# Patient Record
Sex: FEMALE | Race: WHITE | NOT HISPANIC OR LATINO | URBAN - METROPOLITAN AREA
[De-identification: names, ages, dates, MRNs, and addresses within clinical notes are randomized per-mention and may not be internally consistent; named-entity substitution may affect disease eponyms.]

---

## 2020-11-23 ENCOUNTER — EMERGENCY (EMERGENCY)
Facility: HOSPITAL | Age: 70
LOS: 1 days | Discharge: HOME | End: 2020-11-23

## 2020-11-23 ENCOUNTER — INPATIENT (INPATIENT)
Facility: HOSPITAL | Age: 70
LOS: 6 days | Discharge: HOME | DRG: 913 | End: 2020-11-30
Attending: INTERNAL MEDICINE | Admitting: INTERNAL MEDICINE
Payer: MEDICARE

## 2020-11-23 VITALS — HEIGHT: 62 IN

## 2020-11-23 VITALS
RESPIRATION RATE: 18 BRPM | HEIGHT: 63 IN | DIASTOLIC BLOOD PRESSURE: 84 MMHG | OXYGEN SATURATION: 100 % | SYSTOLIC BLOOD PRESSURE: 154 MMHG | TEMPERATURE: 98 F | WEIGHT: 115.08 LBS | HEART RATE: 111 BPM

## 2020-11-23 DIAGNOSIS — Z90.49 ACQUIRED ABSENCE OF OTHER SPECIFIED PARTS OF DIGESTIVE TRACT: Chronic | ICD-10-CM

## 2020-11-23 DIAGNOSIS — K66.1 HEMOPERITONEUM: ICD-10-CM

## 2020-11-23 LAB
ALBUMIN SERPL ELPH-MCNC: 4.1 G/DL — SIGNIFICANT CHANGE UP (ref 3.3–5)
ALP SERPL-CCNC: 132 U/L — HIGH (ref 40–120)
ALT FLD-CCNC: 27 U/L — SIGNIFICANT CHANGE UP (ref 10–45)
ANION GAP SERPL CALC-SCNC: 18 MMOL/L — HIGH (ref 5–17)
ANION GAP SERPL CALC-SCNC: 19 MMOL/L — HIGH (ref 5–17)
APPEARANCE UR: ABNORMAL
APTT BLD: 29.4 SEC — SIGNIFICANT CHANGE UP (ref 27.5–35.5)
AST SERPL-CCNC: 22 U/L — SIGNIFICANT CHANGE UP (ref 10–40)
BACTERIA # UR AUTO: NEGATIVE — SIGNIFICANT CHANGE UP
BASOPHILS # BLD AUTO: 0 K/UL — SIGNIFICANT CHANGE UP (ref 0–0.2)
BASOPHILS NFR BLD AUTO: 0 % — SIGNIFICANT CHANGE UP (ref 0–2)
BILIRUB SERPL-MCNC: 0.2 MG/DL — SIGNIFICANT CHANGE UP (ref 0.2–1.2)
BILIRUB UR-MCNC: NEGATIVE — SIGNIFICANT CHANGE UP
BLD GP AB SCN SERPL QL: NEGATIVE — SIGNIFICANT CHANGE UP
BUN SERPL-MCNC: 104 MG/DL — HIGH (ref 7–23)
BUN SERPL-MCNC: 109 MG/DL — HIGH (ref 7–23)
CALCIUM SERPL-MCNC: 8.8 MG/DL — SIGNIFICANT CHANGE UP (ref 8.4–10.5)
CALCIUM SERPL-MCNC: 9.2 MG/DL — SIGNIFICANT CHANGE UP (ref 8.4–10.5)
CHLORIDE SERPL-SCNC: 105 MMOL/L — SIGNIFICANT CHANGE UP (ref 96–108)
CHLORIDE SERPL-SCNC: 107 MMOL/L — SIGNIFICANT CHANGE UP (ref 96–108)
CO2 SERPL-SCNC: 15 MMOL/L — LOW (ref 22–31)
CO2 SERPL-SCNC: 16 MMOL/L — LOW (ref 22–31)
COLOR SPEC: SIGNIFICANT CHANGE UP
CREAT SERPL-MCNC: 6.55 MG/DL — HIGH (ref 0.5–1.3)
CREAT SERPL-MCNC: 6.75 MG/DL — HIGH (ref 0.5–1.3)
DIFF PNL FLD: ABNORMAL
EOSINOPHIL # BLD AUTO: 0 K/UL — SIGNIFICANT CHANGE UP (ref 0–0.5)
EOSINOPHIL NFR BLD AUTO: 0 % — SIGNIFICANT CHANGE UP (ref 0–6)
EPI CELLS # UR: 1 /HPF — SIGNIFICANT CHANGE UP
GAS PNL BLDV: SIGNIFICANT CHANGE UP
GLUCOSE SERPL-MCNC: 114 MG/DL — HIGH (ref 70–99)
GLUCOSE SERPL-MCNC: 119 MG/DL — HIGH (ref 70–99)
GLUCOSE UR QL: ABNORMAL
HCT VFR BLD CALC: 18.9 % — CRITICAL LOW (ref 34.5–45)
HCT VFR BLD CALC: 23.2 % — LOW (ref 34.5–45)
HGB BLD-MCNC: 6.4 G/DL — CRITICAL LOW (ref 11.5–15.5)
HGB BLD-MCNC: 7.8 G/DL — LOW (ref 11.5–15.5)
HYALINE CASTS # UR AUTO: 1 /LPF — SIGNIFICANT CHANGE UP (ref 0–2)
INR BLD: 0.97 RATIO — SIGNIFICANT CHANGE UP (ref 0.88–1.16)
KETONES UR-MCNC: NEGATIVE — SIGNIFICANT CHANGE UP
LEUKOCYTE ESTERASE UR-ACNC: ABNORMAL
LIDOCAIN IGE QN: 212 U/L — HIGH (ref 7–60)
LYMPHOCYTES # BLD AUTO: 0.58 K/UL — LOW (ref 1–3.3)
LYMPHOCYTES # BLD AUTO: 4.4 % — LOW (ref 13–44)
MCHC RBC-ENTMCNC: 33.3 PG — SIGNIFICANT CHANGE UP (ref 27–34)
MCHC RBC-ENTMCNC: 33.6 GM/DL — SIGNIFICANT CHANGE UP (ref 32–36)
MCHC RBC-ENTMCNC: 33.9 GM/DL — SIGNIFICANT CHANGE UP (ref 32–36)
MCHC RBC-ENTMCNC: 36.2 PG — HIGH (ref 27–34)
MCV RBC AUTO: 106.8 FL — HIGH (ref 80–100)
MCV RBC AUTO: 99.1 FL — SIGNIFICANT CHANGE UP (ref 80–100)
MONOCYTES # BLD AUTO: 0.57 K/UL — SIGNIFICANT CHANGE UP (ref 0–0.9)
MONOCYTES NFR BLD AUTO: 4.3 % — SIGNIFICANT CHANGE UP (ref 2–14)
NEUTROPHILS # BLD AUTO: 12.08 K/UL — HIGH (ref 1.8–7.4)
NEUTROPHILS NFR BLD AUTO: 91.3 % — HIGH (ref 43–77)
NITRITE UR-MCNC: NEGATIVE — SIGNIFICANT CHANGE UP
NRBC # BLD: 0 /100 WBCS — SIGNIFICANT CHANGE UP (ref 0–0)
PH UR: 6 — SIGNIFICANT CHANGE UP (ref 5–8)
PLATELET # BLD AUTO: 121 K/UL — LOW (ref 150–400)
PLATELET # BLD AUTO: 159 K/UL — SIGNIFICANT CHANGE UP (ref 150–400)
POTASSIUM SERPL-MCNC: 5 MMOL/L — SIGNIFICANT CHANGE UP (ref 3.5–5.3)
POTASSIUM SERPL-MCNC: 6.1 MMOL/L — HIGH (ref 3.5–5.3)
POTASSIUM SERPL-SCNC: 5 MMOL/L — SIGNIFICANT CHANGE UP (ref 3.5–5.3)
POTASSIUM SERPL-SCNC: 6.1 MMOL/L — HIGH (ref 3.5–5.3)
PROT SERPL-MCNC: 7.2 G/DL — SIGNIFICANT CHANGE UP (ref 6–8.3)
PROT UR-MCNC: ABNORMAL
PROTHROM AB SERPL-ACNC: 11.7 SEC — SIGNIFICANT CHANGE UP (ref 10.6–13.6)
RBC # BLD: 1.77 M/UL — LOW (ref 3.8–5.2)
RBC # BLD: 2.34 M/UL — LOW (ref 3.8–5.2)
RBC # FLD: 13 % — SIGNIFICANT CHANGE UP (ref 10.3–14.5)
RBC # FLD: 17.3 % — HIGH (ref 10.3–14.5)
RBC CASTS # UR COMP ASSIST: 9 /HPF — HIGH (ref 0–4)
RH IG SCN BLD-IMP: POSITIVE — SIGNIFICANT CHANGE UP
SARS-COV-2 RNA SPEC QL NAA+PROBE: SIGNIFICANT CHANGE UP
SODIUM SERPL-SCNC: 138 MMOL/L — SIGNIFICANT CHANGE UP (ref 135–145)
SODIUM SERPL-SCNC: 142 MMOL/L — SIGNIFICANT CHANGE UP (ref 135–145)
SP GR SPEC: 1.01 — SIGNIFICANT CHANGE UP (ref 1.01–1.02)
UROBILINOGEN FLD QL: NEGATIVE — SIGNIFICANT CHANGE UP
WBC # BLD: 11.55 K/UL — HIGH (ref 3.8–10.5)
WBC # BLD: 13.23 K/UL — HIGH (ref 3.8–10.5)
WBC # FLD AUTO: 11.55 K/UL — HIGH (ref 3.8–10.5)
WBC # FLD AUTO: 13.23 K/UL — HIGH (ref 3.8–10.5)
WBC UR QL: 27 /HPF — HIGH (ref 0–5)

## 2020-11-23 PROCEDURE — 93010 ELECTROCARDIOGRAM REPORT: CPT

## 2020-11-23 PROCEDURE — 99223 1ST HOSP IP/OBS HIGH 75: CPT

## 2020-11-23 PROCEDURE — 99291 CRITICAL CARE FIRST HOUR: CPT

## 2020-11-23 PROCEDURE — 74176 CT ABD & PELVIS W/O CONTRAST: CPT | Mod: 26

## 2020-11-23 RX ORDER — SODIUM CHLORIDE 9 MG/ML
1000 INJECTION INTRAMUSCULAR; INTRAVENOUS; SUBCUTANEOUS ONCE
Refills: 0 | Status: COMPLETED | OUTPATIENT
Start: 2020-11-23 | End: 2020-11-23

## 2020-11-23 RX ORDER — AMLODIPINE BESYLATE 2.5 MG/1
1 TABLET ORAL
Qty: 0 | Refills: 0 | DISCHARGE

## 2020-11-23 RX ORDER — ONDANSETRON 8 MG/1
4 TABLET, FILM COATED ORAL ONCE
Refills: 0 | Status: COMPLETED | OUTPATIENT
Start: 2020-11-23 | End: 2020-11-23

## 2020-11-23 RX ORDER — DULOXETINE HYDROCHLORIDE 30 MG/1
1 CAPSULE, DELAYED RELEASE ORAL
Qty: 0 | Refills: 0 | DISCHARGE

## 2020-11-23 RX ORDER — SODIUM BICARBONATE 1 MEQ/ML
50 SYRINGE (ML) INTRAVENOUS ONCE
Refills: 0 | Status: COMPLETED | OUTPATIENT
Start: 2020-11-23 | End: 2020-11-23

## 2020-11-23 RX ORDER — ACETAMINOPHEN 500 MG
650 TABLET ORAL EVERY 6 HOURS
Refills: 0 | Status: DISCONTINUED | OUTPATIENT
Start: 2020-11-23 | End: 2020-11-30

## 2020-11-23 RX ORDER — METOPROLOL TARTRATE 50 MG
25 TABLET ORAL DAILY
Refills: 0 | Status: DISCONTINUED | OUTPATIENT
Start: 2020-11-23 | End: 2020-11-30

## 2020-11-23 RX ORDER — SODIUM CHLORIDE 9 MG/ML
1000 INJECTION INTRAMUSCULAR; INTRAVENOUS; SUBCUTANEOUS
Refills: 0 | Status: DISCONTINUED | OUTPATIENT
Start: 2020-11-23 | End: 2020-11-24

## 2020-11-23 RX ORDER — MORPHINE SULFATE 50 MG/1
2 CAPSULE, EXTENDED RELEASE ORAL EVERY 6 HOURS
Refills: 0 | Status: DISCONTINUED | OUTPATIENT
Start: 2020-11-23 | End: 2020-11-30

## 2020-11-23 RX ORDER — AMLODIPINE BESYLATE 2.5 MG/1
5 TABLET ORAL DAILY
Refills: 0 | Status: DISCONTINUED | OUTPATIENT
Start: 2020-11-23 | End: 2020-11-30

## 2020-11-23 RX ORDER — ACETAMINOPHEN 500 MG
975 TABLET ORAL ONCE
Refills: 0 | Status: COMPLETED | OUTPATIENT
Start: 2020-11-23 | End: 2020-11-23

## 2020-11-23 RX ORDER — CEFTRIAXONE 500 MG/1
1000 INJECTION, POWDER, FOR SOLUTION INTRAMUSCULAR; INTRAVENOUS ONCE
Refills: 0 | Status: COMPLETED | OUTPATIENT
Start: 2020-11-23 | End: 2020-11-23

## 2020-11-23 RX ORDER — INSULIN HUMAN 100 [IU]/ML
6 INJECTION, SOLUTION SUBCUTANEOUS ONCE
Refills: 0 | Status: COMPLETED | OUTPATIENT
Start: 2020-11-23 | End: 2020-11-23

## 2020-11-23 RX ORDER — DULOXETINE HYDROCHLORIDE 30 MG/1
60 CAPSULE, DELAYED RELEASE ORAL DAILY
Refills: 0 | Status: DISCONTINUED | OUTPATIENT
Start: 2020-11-23 | End: 2020-11-30

## 2020-11-23 RX ORDER — METOPROLOL TARTRATE 50 MG
1 TABLET ORAL
Qty: 0 | Refills: 0 | DISCHARGE

## 2020-11-23 RX ORDER — OXYCODONE HYDROCHLORIDE 5 MG/1
5 TABLET ORAL EVERY 6 HOURS
Refills: 0 | Status: DISCONTINUED | OUTPATIENT
Start: 2020-11-23 | End: 2020-11-30

## 2020-11-23 RX ORDER — DEXTROSE 50 % IN WATER 50 %
50 SYRINGE (ML) INTRAVENOUS ONCE
Refills: 0 | Status: COMPLETED | OUTPATIENT
Start: 2020-11-23 | End: 2020-11-23

## 2020-11-23 RX ADMIN — SODIUM CHLORIDE 75 MILLILITER(S): 9 INJECTION INTRAMUSCULAR; INTRAVENOUS; SUBCUTANEOUS at 23:43

## 2020-11-23 RX ADMIN — Medication 50 MILLILITER(S): at 15:29

## 2020-11-23 RX ADMIN — Medication 50 MILLIEQUIVALENT(S): at 15:29

## 2020-11-23 RX ADMIN — SODIUM CHLORIDE 1000 MILLILITER(S): 9 INJECTION INTRAMUSCULAR; INTRAVENOUS; SUBCUTANEOUS at 18:27

## 2020-11-23 RX ADMIN — ONDANSETRON 4 MILLIGRAM(S): 8 TABLET, FILM COATED ORAL at 14:33

## 2020-11-23 RX ADMIN — Medication 975 MILLIGRAM(S): at 17:04

## 2020-11-23 RX ADMIN — INSULIN HUMAN 6 UNIT(S): 100 INJECTION, SOLUTION SUBCUTANEOUS at 15:29

## 2020-11-23 RX ADMIN — SODIUM CHLORIDE 1000 MILLILITER(S): 9 INJECTION INTRAMUSCULAR; INTRAVENOUS; SUBCUTANEOUS at 14:33

## 2020-11-23 RX ADMIN — Medication 975 MILLIGRAM(S): at 14:35

## 2020-11-23 RX ADMIN — CEFTRIAXONE 100 MILLIGRAM(S): 500 INJECTION, POWDER, FOR SOLUTION INTRAMUSCULAR; INTRAVENOUS at 18:26

## 2020-11-23 NOTE — H&P ADULT - PROBLEM SELECTOR PLAN 3
imaging with mesenteric hematoma and hemoperitoneum in setting of lovenox use, likely cause of pts abdominal pain and anemia   - surgery following, no plans for surgical intervention  - bowel rest, NPO/IVF,  transfusions and serial H/H  - if patient does not respond appropriately to blood transfusions, will [id number] IR consultation for angiography and possible embolizations  - surgery to c/t follow imaging with mesenteric hematoma and hemoperitoneum in setting of lovenox use, likely cause of pts abdominal pain and anemia   - surgery following, no plans for surgical intervention  - bowel rest, NPO/IVF,  transfusions and serial H/H  - if patient does not respond appropriately to blood transfusions, will [id number] IR consultation for angiography and possible embolizations  - surgery to c/t follow  - pain control with tylenol, oxycodone or morphine as needed

## 2020-11-23 NOTE — H&P ADULT - PROBLEM SELECTOR PLAN 6
stage 4 colon cancer with mets to bladder - unclear if further metastatic disease present. pt awaiting medications to start oral chemo  - f/u with oncology in AM for further collateral and hx

## 2020-11-23 NOTE — H&P ADULT - NSHPREVIEWOFSYSTEMS_GEN_ALL_CORE
REVIEW OF SYSTEMS:    CONSTITUTIONAL: No fevers, chills  EYES/ENT: No visual changes;  no throat pain   NECK: No pain or stiffness  RESPIRATORY: No cough, no shortness of breath  CARDIOVASCULAR: No chest pain or palpitations  GASTROINTESTINAL: +nausea, no vomiting, +abdominal pain as per HPI, no BRBPR  GENITOURINARY: no polyuria, no dysuria, no oliguria   NEUROLOGICAL: no numbness, no headaches, no confusion   MUSCULOSKELETAL: no back pain, no focal weakness   SKIN: No itching, burning, rashes, or lesions   PSYCH: no anxiety, depression  HEME: no gum bleeding, no bruising

## 2020-11-23 NOTE — ED PROVIDER NOTE - OBJECTIVE STATEMENT
69yo F hx htn, neuropathic pain, stage 4 colon ca s/p colectomy, last chemo 1 month ago p/w 1 day of nausea, bilious vomiting, and generalized lower abd pain that feels like cramping and dec PO. Last small BM this AM but notes she shas not passed any gas today. Was sent in by her pcp. No fever, chills, cp, sob, back pain, urinary sx.

## 2020-11-23 NOTE — H&P ADULT - PROBLEM SELECTOR PLAN 2
LEONEL vs CKD - unclear baseline Cr, but elevated to 6.75 with metabolic acidosis and hyperkalemia. HyperK resolved in ED s/p insulin/bicarb. Likely 2/2 severe b/l hydro and bladder mass  - check urine lytes and renal US  - f/u with PMD/urology regarding baseline Cr  - may need decompression with nephrostomy tubes - urology following   - strict I/Os, monitor PVR, monitor CR closely, renally dose medications, avoid nephrotoxins   - renal consult in AM

## 2020-11-23 NOTE — H&P ADULT - PROBLEM SELECTOR PLAN 1
acute anemia with Hb 6.4, unclear baseline - likely in setting of mesenteric hematoma while on full AC. remains HD stable     - s/p 1u PRBC in ED, will f/u post transfusion CBC   - monitor CBC q6-8 hours for now to assess for stability  - active T&S  - surgery following; may need IR consult for embolization if not responding to transfusions

## 2020-11-23 NOTE — H&P ADULT - NSICDXPASTSURGICALHX_GEN_ALL_CORE_FT
PAST SURGICAL HISTORY:   delivery delivered x 2 -  and     H/O colonoscopy     H/O hemicolectomy

## 2020-11-23 NOTE — H&P ADULT - HISTORY OF PRESENT ILLNESS
70F with PMH of HTN, neuropathic pain, stage 4 colon cancer (initially dx 2004 s/p hemicolectomy, with recurrence in past years with mets to the bladder c/b hydronephrosis s/p R ureteral stent last exchanged 9/2020) currently on chemo, DVT (?neck) on lovenox p/w abdominal pain. States she started having abdominal the night prior to admission, located in b/l lower quadrants, intermittent, crampy, 8/10 at its worst, non radiating, mildly improved after urination, had small BM this morning, but minimal flatus since then; has chronic nausea from cancer/chemo, not currently worse than usual, no vomiting, no blood in stool, no dysuria, hematuria, no change in urinary frequency or volume. No dizziness, lightheadedness, no CP, SOB/ELLISON, palpitations. Endorses chronic mild LE edema, currently at baseline. Rest of ROS negative    Pt follows with Dr Goldberg for chemo for stage 4 colon cancer - last chemo 4-5 weeks ago, at which time decision made to change to oral chemo, however has not received new medications yet. Had R ureteral stent placed a couple of years ago and follows with Dr Sawyer q3-5months for exchanges. Pt has required rare intermittent blood transfusions in the past. In not aware if she has kidney disease.

## 2020-11-23 NOTE — CONSULT NOTE ADULT - SUBJECTIVE AND OBJECTIVE BOX
GENERAL SURGERY CONSULT NOTE    70F on therapeutic lovenox with metastatic colon cancer presents with one day of abdominal pain and found to have a mesenteric hematoma on CT scan. As per patient, the pain was sudden onset and did not get better throughout the day. Patient reports passing gas and having bowel movements at home with no vomiting. She reports baseline mild nausea (she had been on chemotherapy until four weeks ago when she was told that they were switching her to an oral chemotherapeutic agent, but insurance has not verified yet). Of note, patient not aware of her home medications but endorses being on lovenox at home for a clot 'diagnosed in her neck'.  On arrival in the ED, patient with mild tachycardia improved with transfusion. Exam with abdominal fullness and palpable left sided mass with associated tenderness. Hemoglobin found to be 6.1 (patient endorses needing transfusions in the past but is not aware of baseline H/H) and creatinine of 6.7. CT scan showing bilateral hydronephrosis with bladder mass and mesenteric hematoma.       PAST MEDICAL & SURGICAL HISTORY:  Lumbar herniated disc    Osteoporosis    H/O colonoscopy     delivery delivered  x  -  and       FAMILY HISTORY:  No pertinent family history in first degree relatives      SOCIAL HISTORY:    MEDICATIONS  (STANDING):    MEDICATIONS  (PRN):    Allergies    No Known Allergies    Intolerances    PHYSICAL EXAM    Vital Signs Last 24 Hrs  T(C): 36.8 (2020 16:03), Max: 36.8 (2020 16:03)  T(F): 98.2 (2020 16:03), Max: 98.2 (2020 16:03)  HR: 99 (2020 16:03) (99 - 111)  BP: 155/87 (2020 16:03) (154/84 - 162/79)  BP(mean): --  RR: 17 (2020 16:03) (17 - 18)  SpO2: 100% (2020 16:03) (100% - 100%)  Daily Height in cm: 160.02 (2020 12:34)    Daily     General: WN/WD NAD  Neurology: A&Ox3, nonfocal, BRENNAN x 4  Head:  Normocephalic, atraumatic  ENT:  Mucosa moist, no ulcerations  Neck:  Supple, no sinuses or palpable masses  Lymphatic:  No palpable cervical, supraclavicular, axillary or inguinal adenopathy  Respiratory: CTA B/L  CV: RRR, S1S2, no murmur  Abdominal: Soft, some fullness and left sided tenderness to palpation, palpable mass on the left abdominal wall  MSK: No edema, + peripheral pulses, FROM all 4 extremity                            6.4    13.23 )-----------( 159      ( 2020 14:23 )             18.9         142  |  107  |  104<H>  ----------------------------<  119<H>  5.0   |  16<L>  |  6.55<H>    Ca    8.8      2020 17:15    TPro  7.2  /  Alb  4.1  /  TBili  0.2  /  DBili  x   /  AST  22  /  ALT  27  /  AlkPhos  132<H>  11-23    PT/INR - ( 2020 14:23 )   PT: 11.7 sec;   INR: 0.97 ratio         PTT - ( 2020 14:23 )  PTT:29.4 sec  Urinalysis Basic - ( 2020 17:16 )    Color: Light Yellow / Appearance: Slightly Turbid / S.012 / pH: x  Gluc: x / Ketone: Negative  / Bili: Negative / Urobili: Negative   Blood: x / Protein: 30 mg/dL / Nitrite: Negative   Leuk Esterase: Moderate / RBC: 9 /hpf / WBC 27 /HPF   Sq Epi: x / Non Sq Epi: 1 /hpf / Bacteria: Negative        IMAGING STUDIES:

## 2020-11-23 NOTE — ED ADULT NURSE REASSESSMENT NOTE - NS ED NURSE REASSESS COMMENT FT1
1847 pt blood transfusion started via pump and tolerating without any incident Pt pending surgery consult for blood in the abd Pt on blood thinner will continue to monitor Mpuleorn

## 2020-11-23 NOTE — CONSULT NOTE ADULT - ASSESSMENT
69 y/o female presenting with anemia found to have bilateral hydronephrosis and left ureteral stent with elevated Cr    -no acute  intervention at this point. pt is followed closely by her outside urologist Chantelle Sawyer MD and gets left ureteral stent changes  q3-5 months (last stent change was 9/2020)  -get baseline creatinine level from outside records  -trend Cr  -check Post Void Residual (will need alberts catheter if pvr >200cc)  -UCx  -will follow along  will discuss with attending, full recs to follow 69 y/o female presenting with anemia found to have bilateral hydronephrosis and left ureteral stent with elevated Cr    -no acute  intervention at this point. pt is followed closely by her outside urologist Chantelle Sawyer MD and gets left ureteral stent changes  q3-5 months (last stent change was 9/2020)  -get baseline creatinine level from outside records  -trend Cr  -check Post Void Residual (will need alberts catheter if pvr >200cc)  -f/u UCx  -will follow along  will discuss with attending, full recs to follow 69 y/o female presenting with anemia found to have bilateral hydronephrosis and left ureteral stent with elevated Cr    -no acute  intervention at this point. pt is followed closely by her outside urologist Chantelle Sawyer MD and gets left ureteral stent changes  q3-5 months (last stent change was 9/2020)  -get baseline creatinine level from outside records  -trend Cr  -Strict I/O, check Post Void Residual (will need alberts catheter if pvr >200cc)  -f/u UCx  -will follow along

## 2020-11-23 NOTE — ED PROVIDER NOTE - CLINICAL SUMMARY MEDICAL DECISION MAKING FREE TEXT BOX
Lower abd pain, n/v, no flatus and dec bowel sounds with some ttp on exam in a pt with hx of colon CA. R/o SBO. Labs, ua, CT pain control Lower abd pain, n/v, no flatus and dec bowel sounds with some ttp on exam in a pt with hx of colon CA. R/o SBO. Labs, ua, CT pain control. Found to have hemoperitoneum with bilateral hydro. Surgery will follow, no intervention at this time. Urology no acute intervention. Anemic, 1uprbc. Admit

## 2020-11-23 NOTE — ED PROVIDER NOTE - NS ED ROS FT
CONSTITUTIONAL: No fevers, no chills, no lightheadedness, no dizziness  EYES: no visual changes, no eye pain  EARS: no ear drainage, no ear pain, no change in hearing  NOSE: no nasal congestion  MOUTH/THROAT: no sore throat  CV: No chest pain, no palpitations  RESP: No SOB, no cough  GI: see hpi  : no dysuria, no hematuria, no flank pain  MSK: no back pain, no extremity pain  SKIN: no rashes  NEURO: no headache, no focal weakness, no decreased sensation/parasthesias

## 2020-11-23 NOTE — ED PROVIDER NOTE - SHIFT CHANGE DETAILS
Attending MD Bueno: 70F with new anemia, new renal failure, here for abdominal pain/distention, pending CT for SBO then TBA, h/x colon ca s/p colectomy, consented for transfusion and blood ordered

## 2020-11-23 NOTE — CONSULT NOTE ADULT - SUBJECTIVE AND OBJECTIVE BOX
Brief onc note:     Pt is a 69 yo female with colon cancer presenting from [facility] for n/v and lower abdominal pain .    - If stable for gen medical floor admit to Dr STEVE Haney   - full consult to follow       Maldonado Chacon MD  Hematology Oncology   [phone]

## 2020-11-23 NOTE — CONSULT NOTE ADULT - ASSESSMENT
70F on therapeutic with metastatic colon cancer presents with one day of abdominal pain and found to have a mesenteric hematoma on CT scan.     -would recommend bowel rest, NPO/IVF,  transfusions and serial H/H  -no operative intervention indicated at this time  -if patient does not respond appropriately to blood transfusions then would recommend IR consultation for angiography and possible embolizations  -will follow with you  -discussed with fellow Dr. Inna Pritchard    page [phone] with surgical questions  Jossy Nicolas, PGY-5

## 2020-11-23 NOTE — ED ADULT NURSE NOTE - NSIMPLEMENTINTERV_GEN_ALL_ED
Implemented All Universal Safety Interventions:  Deer to call system. Call bell, personal items and telephone within reach. Instruct patient to call for assistance. Room bathroom lighting operational. Non-slip footwear when patient is off stretcher. Physically safe environment: no spills, clutter or unnecessary equipment. Stretcher in lowest position, wheels locked, appropriate side rails in place.

## 2020-11-23 NOTE — H&P ADULT - ASSESSMENT
70F with PMH of HTN, neuropathic pain, stage 4 colon cancer (initially dx 2004 s/p hemicolectomy, with recurrence in past years with mets to the bladder c/b hydronephrosis s/p R ureteral stent last exchanged 9/2020) currently on chemo, DVT (?neck) on lovenox p/w abdominal pain -  found with anemia and LEONEL with imaging showing mesenteric hematoma and hemoperitoneum and b/l hydronephrosis - a/w acute blood loss anemia and LEONEL vs CKD.

## 2020-11-23 NOTE — CONSULT NOTE ADULT - SUBJECTIVE AND OBJECTIVE BOX
HPI:  Patient is a 70y Female who presented with  PAST MEDICAL & SURGICAL HISTORY:  Lumbar herniated disc    Osteoporosis    H/O colonoscopy     delivery delivered  x 1977 and       FAMILY HISTORY:  No pertinent family history in first degree relatives      SOCIAL HISTORY:   Tobacco hx:    MEDICATIONS  (STANDING):    MEDICATIONS  (PRN):    Allergies    No Known Allergies    Intolerances        REVIEW OF SYSTEMS: Pertinent positives and negatives as stated in HPI, otherwise negative    Vital signs  T(C): 36.8 (20 @ 16:03), Max: 36.8 (20 @ 16:03)  HR: 99 (20 @ 16:03)  BP: 155/87 (20 @ 16:03)  SpO2: 100% (20 @ 16:03)  Wt(kg): --    Output    UOP    Physical Exam  Gen: NAD  Pulm: No intercostal retractions  Back:  Abd: Soft, NT, ND  : Uncircumcised/Circumcised, no lesions.  No discharge or blood at urethral meatus.  Testes descended bilaterally.  Testes and epididymis nontender bilaterally.  Cremasteric reflex present bilaterally.    LABS:           @ 17:15    WBC --    / Hct --    / SCr 6.55      @ 14:23    WBC 13.23 / Hct 18.9  / SCr 6.75         142  |  107  |  104<H>  ----------------------------<  119<H>  5.0   |  16<L>  |  6.55<H>    Ca    8.8      2020 17:15    TPro  7.2  /  Alb  4.1  /  TBili  0.2  /  DBili  x   /  AST  22  /  ALT  27  /  AlkPhos  132<H>      PT/INR - ( 2020 14:23 )   PT: 11.7 sec;   INR: 0.97 ratio         PTT - ( 2020 14:23 )  PTT:29.4 sec  Urinalysis Basic - ( 2020 17:16 )    Color: Light Yellow / Appearance: Slightly Turbid / S.012 / pH: x  Gluc: x / Ketone: Negative  / Bili: Negative / Urobili: Negative   Blood: x / Protein: 30 mg/dL / Nitrite: Negative   Leuk Esterase: Moderate / RBC: 9 /hpf / WBC 27 /HPF   Sq Epi: x / Non Sq Epi: 1 /hpf / Bacteria: Negative        Urine Cx:   Blood Cx:    RADIOLOGY:     HPI:  Patient is a 70y Female PMHx colon CA sent in to ED by her oncologist for evaluation of abdominal pain.  Urology consulted for CT findings of severe bilateral hydronephrosis.  PT followed by outside urologist Inna Sawyer MD.  Pt with hx of ?obstruction and has left ureteral stent which she gets changed ~q3-5 months (last change was 2020).  Pt states that in the past she was told that her right kidney had poor function and also that creatinine has been high.  Denies any fever, chills, flank pain, difficulty urinating    PAST MEDICAL & SURGICAL HISTORY:  Lumbar herniated disc    Osteoporosis    H/O colonoscopy     delivery delivered  x 2 -  and       FAMILY HISTORY:  No pertinent family history in first degree relatives      SOCIAL HISTORY:   Tobacco hx:    MEDICATIONS  (STANDING):    MEDICATIONS  (PRN):    Allergies    No Known Allergies    Intolerances      REVIEW OF SYSTEMS: Pertinent positives and negatives as stated in HPI, otherwise negative    Vital signs  T(C): 36.8 (20 @ 16:03), Max: 36.8 (20 @ 16:03)  HR: 99 (20 @ 16:03)  BP: 155/87 (20 @ 16:03)  SpO2: 100% (20 @ 16:03)  Wt(kg): --    Output    UOP    Physical Exam  Gen: NAD  Pulm: No intercostal retractions  Back: No CVAT bl  Abd: Soft, NT, ND      LABS:     @ 17:15    WBC --    / Hct --    / SCr 6.55      @ 14:23    WBC 13.23 / Hct 18.9  / SCr 6.75         142  |  107  |  104<H>  ----------------------------<  119<H>  5.0   |  16<L>  |  6.55<H>    Ca    8.8      2020 17:15    TPro  7.2  /  Alb  4.1  /  TBili  0.2  /  DBili  x   /  AST  22  /  ALT  27  /  AlkPhos  132<H>      PT/INR - ( 2020 14:23 )   PT: 11.7 sec;   INR: 0.97 ratio         PTT - ( 2020 14:23 )  PTT:29.4 sec  Urinalysis Basic - ( 2020 17:16 )    Color: Light Yellow / Appearance: Slightly Turbid / S.012 / pH: x  Gluc: x / Ketone: Negative  / Bili: Negative / Urobili: Negative   Blood: x / Protein: 30 mg/dL / Nitrite: Negative   Leuk Esterase: Moderate / RBC: 9 /hpf / WBC 27 /HPF   Sq Epi: x / Non Sq Epi: 1 /hpf / Bacteria: Negative      Urine Cx:  in progress      RADIOLOGY:  EXAM:  CT ABDOMEN AND PELVIS                            PROCEDURE DATE:  2020          INTERPRETATION:  CLINICAL INFORMATION: Abdominal pain. Stage IV colon cancer status post colectomy. On chemotherapy.    COMPARISON: None.    PROCEDURE:  CT of the Abdomen and Pelvis was performed without intravenous contrast.  Intravenous contrast: None.  Oral contrast: None.  Sagittal and coronal reformats were performed.    FINDINGS:  LOWER CHEST: Right pleural effusion.    LIVER: High attenuation of the liver, which may be secondary to medication.  BILE DUCTS: Normal caliber.  GALLBLADDER: Within normal limits.  SPLEEN: Within normal limits.  PANCREAS: Within normal limits.  ADRENALS: Within normal limits.  KIDNEYS/URETERS: Bilateral severe hydronephrosis, right greater than left. On the left side, there is a ureteral stent with the proximal tip within the left renal pelvis and the distal tip correlating within the urinary bladder. Cortical thinning on the right side.    BLADDER: A 3.5 x 2.4 cm high attenuation mass seen in the posterior aspect of the urinary bladder.  REPRODUCTIVE ORGANS: The uterus is unremarkable.    BOWEL: Status post right hemicolectomy with ileocolic anastomosis. High attenuating mesenteric hematoma with hematocrit level seen within the bowel mesentery measuring 5.2 x 3.2 cm (series 2 image 54). There is associated infiltration and edema within the bowel mesentery. No bowel obstruction.  PERITONEUM: Perihepatic and perisplenic complex fluid.  VESSELS: Arterial calcifications.  RETROPERITONEUM/LYMPH NODES: No lymphadenopathy.  ABDOMINAL WALL: High attenuating nodularity seen within the subcutaneous tissues of the right anterior abdomen measuring up to 0.8 cm (series 2 image 75).  BONES: Degenerative changes spine.    IMPRESSION:    Large mesenteric hematoma. Additional hemoperitoneum.    Bladder mass and severe bilateral hydronephrosis.    These findings were discussed with Dr. ENEIDA LOPEZ  at 2020 6:07 PM by Dr. Montoya with read back confirmation.        ZANE MONTOYA MD; Resident Radiology  This document has been electronically signed.  ELIEZER FIELDS MD; Attending Radiologist  This document has been electronically signed. 2020  6:22PM

## 2020-11-23 NOTE — ED CLERICAL - NS ED CLERK NOTE PRE-ARRIVAL INFORMATION; ADDITIONAL PRE-ARRIVAL INFORMATION
CC/Reason For referral: abdominal pain; nausea; vomiting; colon cancer; chemo treatment 10/19/20  Preferred Consultant(if applicable):  Who admits for you (if needed):  Do you have documents you would like to fax over?  Would you still like to speak to an ED attending? YES

## 2020-11-23 NOTE — H&P ADULT - NSHPPHYSICALEXAM_GEN_ALL_CORE
Vital Signs Last 24 Hrs  T(C): 37.2 (23 Nov 2020 22:12), Max: 37.2 (23 Nov 2020 22:12)  T(F): 99 (23 Nov 2020 22:12), Max: 99 (23 Nov 2020 22:12)  HR: 101 (23 Nov 2020 22:12) (99 - 111)  BP: 177/84 (23 Nov 2020 22:12) (154/84 - 177/84)  BP(mean): --  RR: 18 (23 Nov 2020 22:12) (17 - 18)  SpO2: 98% (23 Nov 2020 22:12) (98% - 100%)    PHYSICAL EXAM:  GENERAL: NAD, well-developed  HEAD:  Atraumatic, normocephalic  EYES: EOMI, conjunctiva and sclera clear  NECK: Supple, no JVD  CHEST/LUNG: decreased BS at RLL, no rales, otherwise CTA   HEART: Regular rate and rhythm; no murmurs  ABDOMEN: Soft, tender in lower quadrants R>L, nondistended; bowel sounds present  EXTREMITIES:  2+ Peripheral Pulses, no edema  PSYCH: calm affect, not anxious  NEUROLOGY: non-focal, AAOx3  SKIN: No rashes or lesions  MUSCULOSKELETAL: no back pain, moving all extremities

## 2020-11-23 NOTE — H&P ADULT - NSHPLABSRESULTS_GEN_ALL_CORE
Labs, imaging and EKG personally reviewed and interpreted by me.                         6.4    . )-----------( 159      ( 2020 14:23 )             18.9         142  |  107  |  104<H>  ----------------------------<  119<H>  5.0   |  16<L>  |  6.55<H>    Ca    8.8      2020 17:15    TPro  7.2  /  Alb  4.1  /  TBili  0.2  /  DBili  x   /  AST  22  /  ALT  27  /  AlkPhos  132<H>  1123        PT/INR - ( 2020 14:23 )   PT: 11.7 sec;   INR: 0.97 ratio         PTT - ( 2020 14:23 )  PTT:29.4 sec    Urinalysis Basic - ( 2020 17:16 )    Color: Light Yellow / Appearance: Slightly Turbid / S.012 / pH: x  Gluc: x / Ketone: Negative  / Bili: Negative / Urobili: Negative   Blood: x / Protein: 30 mg/dL / Nitrite: Negative   Leuk Esterase: Moderate / RBC: 9 /hpf / WBC 27 /HPF   Sq Epi: x / Non Sq Epi: 1 /hpf / Bacteria: Negative      < from: CT Abdomen and Pelvis No Cont (20 @ 17:36) >    LIVER: High attenuation of the liver, which may be secondary to medication.  BILE DUCTS: Normal caliber.  GALLBLADDER: Within normal limits.  SPLEEN: Within normal limits.  PANCREAS: Within normal limits.  ADRENALS: Within normal limits.  KIDNEYS/URETERS: Bilateral severe hydronephrosis, right greater than left. On the left side, there is a ureteral stent with the proximal tip within the left renal pelvis and the distal tip correlating within the urinary bladder. Cortical thinning on the right side.    BLADDER: A 3.5 x 2.4 cm high attenuation mass seen in the posterior aspect of the urinary bladder.  REPRODUCTIVE ORGANS: The uterus is unremarkable.    BOWEL: Status post right hemicolectomy with ileocolic anastomosis. High attenuating mesenteric hematoma with hematocrit level seen within the bowel mesentery measuring 5.2 x 3.2 cm (series 2 image 54). There is associated infiltration and edema within the bowel mesentery. No bowel obstruction.  PERITONEUM: Perihepatic and perisplenic complex fluid.  VESSELS: Arterialcalcifications.  RETROPERITONEUM/LYMPH NODES: No lymphadenopathy.  ABDOMINAL WALL: High attenuating nodularity seen within the subcutaneous tissues of the right anterior abdomen measuring up to 0.8 cm (series 2 image 75).  BONES: Degenerative changesspine.    IMPRESSION:    Large mesenteric hematoma. Additional hemoperitoneum.    Bladder mass and severe bilateral hydronephrosis.    < end of copied text >

## 2020-11-23 NOTE — H&P ADULT - ATTENDING COMMENTS
Patient assigned to me by night hospitalist in charge for management and care for patient for this evening only. Care to be resumed by day hospitalist (Dr Haney) in the morning and thereafter.

## 2020-11-23 NOTE — H&P ADULT - NSICDXPASTMEDICALHX_GEN_ALL_CORE_FT
PAST MEDICAL HISTORY:  Colon cancer     DVT, lower extremity     HTN (hypertension)     Hydronephrosis     Lumbar herniated disc     Osteoporosis

## 2020-11-23 NOTE — ED PROVIDER NOTE - PHYSICAL EXAMINATION
Gen: Well appearing, NAD  Head: NCAT  HEENT: PERRL, MMM, normal conjunctiva, anicteric, neck supple  Lung: CTAB, no adventitious sounds  CV: RRR, no murmurs  Abd: soft, lower abd mildly ttp, dec bowel sounds in all quadrants, no rebound or guarding, no CVAT  MSK: No edema, no visible deformities  Neuro: Moving all extremity grossly  Skin: Warm and dry, no evidence of rash

## 2020-11-23 NOTE — ED ADULT NURSE NOTE - OBJECTIVE STATEMENT
69yo F hx htn, neuropathic pain, stage 4 colon ca s/p colectomy, last chemo 1 month ago p/w 1 day of nausea, bilious vomiting, and generalized lower abd pain that feels like cramping and dec PO. Last small BM this AM but notes she has not passed any gas today. Was sent in by her pcp. No fever, chills, cp, sob, back pain, urinary sx.  Pt power port accessed and bloods sent as ordered

## 2020-11-23 NOTE — ED PROVIDER NOTE - PROGRESS NOTE DETAILS
Attending MD Bueno: CT with mesenteric hematoma, hemoperitoneum, bladder mass, severe bilateral hydronephrosis, surgery and uro consulted, will await, patient made aware Attending MD Bueno: Seen by surgery, medical management with bowel rest.  If no response, consider IR for embolization.  No acute surgical management at this time.  Will await uro recs, TBA. Matias: Spoke with urology, no acute intervention indicated, will follow patient with medicine admission.

## 2020-11-23 NOTE — H&P ADULT - PROBLEM SELECTOR PLAN 4
b/l severe hydronephrosis R>L with R ureteral stent noted; per pt, last exchanged 9/2020. Unclear if L hydro is acute or chronic.   - urology following, no plan for intervention at this time   - will need to f/u with Dr Encinas office in AM for further collateral  - strict I/Os  - frequent bladder scans, PVRs   - monitor Cr and electrolytes closely

## 2020-11-23 NOTE — ED PROVIDER NOTE - CARE PLAN
Principal Discharge DX:	Hemoperitoneum  Secondary Diagnosis:	Hydronephrosis  Secondary Diagnosis:	Colon cancer  Secondary Diagnosis:	Abdominal pain

## 2020-11-23 NOTE — ED ADULT NURSE REASSESSMENT NOTE - NS ED NURSE REASSESS COMMENT FT1
1616 pt pot 6.1 meds given by break coverage RN and repeat type and labs to be drawn at 1630 [place]

## 2020-11-23 NOTE — H&P ADULT - PROBLEM SELECTOR PLAN 7
hx of neck vein DVT per pt report, on lovenox (unknown dose)  holding AC for now   f/u with PMD/oncology in AM for further collateral, repeat imaging as needed

## 2020-11-24 DIAGNOSIS — I10 ESSENTIAL (PRIMARY) HYPERTENSION: ICD-10-CM

## 2020-11-24 DIAGNOSIS — D62 ACUTE POSTHEMORRHAGIC ANEMIA: ICD-10-CM

## 2020-11-24 DIAGNOSIS — N13.30 UNSPECIFIED HYDRONEPHROSIS: ICD-10-CM

## 2020-11-24 DIAGNOSIS — S36.892A CONTUSION OF OTHER INTRA-ABDOMINAL ORGANS, INITIAL ENCOUNTER: ICD-10-CM

## 2020-11-24 DIAGNOSIS — C18.9 MALIGNANT NEOPLASM OF COLON, UNSPECIFIED: ICD-10-CM

## 2020-11-24 DIAGNOSIS — N39.0 URINARY TRACT INFECTION, SITE NOT SPECIFIED: ICD-10-CM

## 2020-11-24 DIAGNOSIS — Z29.9 ENCOUNTER FOR PROPHYLACTIC MEASURES, UNSPECIFIED: ICD-10-CM

## 2020-11-24 DIAGNOSIS — N17.9 ACUTE KIDNEY FAILURE, UNSPECIFIED: ICD-10-CM

## 2020-11-24 DIAGNOSIS — G62.9 POLYNEUROPATHY, UNSPECIFIED: ICD-10-CM

## 2020-11-24 DIAGNOSIS — I82.91 CHRONIC EMBOLISM AND THROMBOSIS OF UNSPECIFIED VEIN: ICD-10-CM

## 2020-11-24 DIAGNOSIS — R09.89 OTHER SPECIFIED SYMPTOMS AND SIGNS INVOLVING THE CIRCULATORY AND RESPIRATORY SYSTEMS: ICD-10-CM

## 2020-11-24 LAB
ALBUMIN SERPL ELPH-MCNC: 3.9 G/DL — SIGNIFICANT CHANGE UP (ref 3.3–5)
ALP SERPL-CCNC: 108 U/L — SIGNIFICANT CHANGE UP (ref 40–120)
ALT FLD-CCNC: 22 U/L — SIGNIFICANT CHANGE UP (ref 10–45)
ANION GAP SERPL CALC-SCNC: 18 MMOL/L — HIGH (ref 5–17)
AST SERPL-CCNC: 20 U/L — SIGNIFICANT CHANGE UP (ref 10–40)
BILIRUB SERPL-MCNC: 0.3 MG/DL — SIGNIFICANT CHANGE UP (ref 0.2–1.2)
BUN SERPL-MCNC: 98 MG/DL — HIGH (ref 7–23)
CALCIUM SERPL-MCNC: 8.8 MG/DL — SIGNIFICANT CHANGE UP (ref 8.4–10.5)
CHLORIDE SERPL-SCNC: 108 MMOL/L — SIGNIFICANT CHANGE UP (ref 96–108)
CO2 SERPL-SCNC: 17 MMOL/L — LOW (ref 22–31)
CREAT ?TM UR-MCNC: 48 MG/DL — SIGNIFICANT CHANGE UP
CREAT SERPL-MCNC: 6.36 MG/DL — HIGH (ref 0.5–1.3)
CULTURE RESULTS: SIGNIFICANT CHANGE UP
GLUCOSE SERPL-MCNC: 94 MG/DL — SIGNIFICANT CHANGE UP (ref 70–99)
HCT VFR BLD CALC: 21 % — CRITICAL LOW (ref 34.5–45)
HCT VFR BLD CALC: 21.3 % — LOW (ref 34.5–45)
HCT VFR BLD CALC: 23.1 % — LOW (ref 34.5–45)
HCT VFR BLD CALC: 24.3 % — LOW (ref 34.5–45)
HCV AB S/CO SERPL IA: 0.16 S/CO — SIGNIFICANT CHANGE UP (ref 0–0.99)
HCV AB SERPL-IMP: SIGNIFICANT CHANGE UP
HGB BLD-MCNC: 7 G/DL — CRITICAL LOW (ref 11.5–15.5)
HGB BLD-MCNC: 7.1 G/DL — LOW (ref 11.5–15.5)
HGB BLD-MCNC: 7.7 G/DL — LOW (ref 11.5–15.5)
HGB BLD-MCNC: 8.1 G/DL — LOW (ref 11.5–15.5)
MAGNESIUM SERPL-MCNC: 1.8 MG/DL — SIGNIFICANT CHANGE UP (ref 1.6–2.6)
MCHC RBC-ENTMCNC: 32.8 PG — SIGNIFICANT CHANGE UP (ref 27–34)
MCHC RBC-ENTMCNC: 32.9 GM/DL — SIGNIFICANT CHANGE UP (ref 32–36)
MCHC RBC-ENTMCNC: 33.1 PG — SIGNIFICANT CHANGE UP (ref 27–34)
MCHC RBC-ENTMCNC: 33.3 GM/DL — SIGNIFICANT CHANGE UP (ref 32–36)
MCHC RBC-ENTMCNC: 33.3 GM/DL — SIGNIFICANT CHANGE UP (ref 32–36)
MCHC RBC-ENTMCNC: 33.3 PG — SIGNIFICANT CHANGE UP (ref 27–34)
MCHC RBC-ENTMCNC: 33.5 PG — SIGNIFICANT CHANGE UP (ref 27–34)
MCHC RBC-ENTMCNC: 33.8 GM/DL — SIGNIFICANT CHANGE UP (ref 32–36)
MCV RBC AUTO: 101.4 FL — HIGH (ref 80–100)
MCV RBC AUTO: 98.3 FL — SIGNIFICANT CHANGE UP (ref 80–100)
MCV RBC AUTO: 99.1 FL — SIGNIFICANT CHANGE UP (ref 80–100)
MCV RBC AUTO: 99.2 FL — SIGNIFICANT CHANGE UP (ref 80–100)
NRBC # BLD: 0 /100 WBCS — SIGNIFICANT CHANGE UP (ref 0–0)
PHOSPHATE SERPL-MCNC: 6.1 MG/DL — HIGH (ref 2.5–4.5)
PLATELET # BLD AUTO: 105 K/UL — LOW (ref 150–400)
PLATELET # BLD AUTO: 118 K/UL — LOW (ref 150–400)
PLATELET # BLD AUTO: 128 K/UL — LOW (ref 150–400)
PLATELET # BLD AUTO: 128 K/UL — LOW (ref 150–400)
POTASSIUM SERPL-MCNC: 4.9 MMOL/L — SIGNIFICANT CHANGE UP (ref 3.5–5.3)
POTASSIUM SERPL-SCNC: 4.9 MMOL/L — SIGNIFICANT CHANGE UP (ref 3.5–5.3)
PROT SERPL-MCNC: 6.8 G/DL — SIGNIFICANT CHANGE UP (ref 6–8.3)
RBC # BLD: 2.1 M/UL — LOW (ref 3.8–5.2)
RBC # BLD: 2.12 M/UL — LOW (ref 3.8–5.2)
RBC # BLD: 2.35 M/UL — LOW (ref 3.8–5.2)
RBC # BLD: 2.45 M/UL — LOW (ref 3.8–5.2)
RBC # FLD: 17.8 % — HIGH (ref 10.3–14.5)
RBC # FLD: 17.8 % — HIGH (ref 10.3–14.5)
RBC # FLD: 18 % — HIGH (ref 10.3–14.5)
RBC # FLD: 18.1 % — HIGH (ref 10.3–14.5)
SODIUM SERPL-SCNC: 143 MMOL/L — SIGNIFICANT CHANGE UP (ref 135–145)
SODIUM UR-SCNC: 49 MMOL/L — SIGNIFICANT CHANGE UP
SPECIMEN SOURCE: SIGNIFICANT CHANGE UP
WBC # BLD: 10.61 K/UL — HIGH (ref 3.8–10.5)
WBC # BLD: 10.62 K/UL — HIGH (ref 3.8–10.5)
WBC # BLD: 11.38 K/UL — HIGH (ref 3.8–10.5)
WBC # BLD: 12.04 K/UL — HIGH (ref 3.8–10.5)
WBC # FLD AUTO: 10.61 K/UL — HIGH (ref 3.8–10.5)
WBC # FLD AUTO: 10.62 K/UL — HIGH (ref 3.8–10.5)
WBC # FLD AUTO: 11.38 K/UL — HIGH (ref 3.8–10.5)
WBC # FLD AUTO: 12.04 K/UL — HIGH (ref 3.8–10.5)

## 2020-11-24 PROCEDURE — 71045 X-RAY EXAM CHEST 1 VIEW: CPT | Mod: 26

## 2020-11-24 PROCEDURE — 99233 SBSQ HOSP IP/OBS HIGH 50: CPT

## 2020-11-24 PROCEDURE — 76770 US EXAM ABDO BACK WALL COMP: CPT | Mod: 26

## 2020-11-24 RX ORDER — CEFTRIAXONE 500 MG/1
1000 INJECTION, POWDER, FOR SOLUTION INTRAMUSCULAR; INTRAVENOUS EVERY 24 HOURS
Refills: 0 | Status: COMPLETED | OUTPATIENT
Start: 2020-11-24 | End: 2020-11-26

## 2020-11-24 RX ORDER — CHLORHEXIDINE GLUCONATE 213 G/1000ML
1 SOLUTION TOPICAL DAILY
Refills: 0 | Status: DISCONTINUED | OUTPATIENT
Start: 2020-11-24 | End: 2020-11-30

## 2020-11-24 RX ORDER — SODIUM CHLORIDE 9 MG/ML
1000 INJECTION, SOLUTION INTRAVENOUS
Refills: 0 | Status: DISCONTINUED | OUTPATIENT
Start: 2020-11-24 | End: 2020-11-28

## 2020-11-24 RX ADMIN — CHLORHEXIDINE GLUCONATE 1 APPLICATION(S): 213 SOLUTION TOPICAL at 12:00

## 2020-11-24 RX ADMIN — CEFTRIAXONE 100 MILLIGRAM(S): 500 INJECTION, POWDER, FOR SOLUTION INTRAMUSCULAR; INTRAVENOUS at 17:31

## 2020-11-24 RX ADMIN — DULOXETINE HYDROCHLORIDE 60 MILLIGRAM(S): 30 CAPSULE, DELAYED RELEASE ORAL at 12:00

## 2020-11-24 RX ADMIN — SODIUM CHLORIDE 75 MILLILITER(S): 9 INJECTION, SOLUTION INTRAVENOUS at 17:38

## 2020-11-24 RX ADMIN — AMLODIPINE BESYLATE 5 MILLIGRAM(S): 2.5 TABLET ORAL at 05:44

## 2020-11-24 RX ADMIN — Medication 25 MILLIGRAM(S): at 05:44

## 2020-11-24 NOTE — PROGRESS NOTE ADULT - SUBJECTIVE AND OBJECTIVE BOX
see PA note for detailes    H&P, ER course and reason for admissions reviewed on 11/23/2020    CT scan results reviewed

## 2020-11-24 NOTE — CONSULT NOTE ADULT - ASSESSMENT
70 year-old woman with LEONEL, etiology ATN due to severe anemia/dehydration vs. obstruction.    1. LEONEL- ddx as above.  Continue IVF. F/u urology. If renal fxn does not improve, will have to discuss GOC re: RRT.  2. CKD4- in the setting of known colon cancer s/p chemotherapy.  3. Chronic obstruction. F/u urology.  4. HTN- BP controlled. Continue current antihypertensive medication.

## 2020-11-24 NOTE — PROGRESS NOTE ADULT - SUBJECTIVE AND OBJECTIVE BOX
MARISOL [id number]  70yFemale  T(C): 36.7 (11-24-20 @ 15:52), Max: 37.2 (11-23-20 @ 22:12)  HR: 99 (11-24-20 @ 15:52) (77 - 108)  BP: 130/79 (11-24-20 @ 15:52) (130/79 - 177/84)  RR: 18 (11-24-20 @ 15:52) (17 - 18)  SpO2: 96% (11-24-20 @ 15:52) (96% - 100%)  Wt(kg): --  11-23 @ 07:01  -  11-24 @ 07:00  --------------------------------------------------------  IN: 525 mL / OUT: 650 mL / NET: -125 mL      normal cephalic atraumatic  s1s2   clear to ascultation bilaterally  soft, non tender, non distended no guarding or rebound  no clubbing cyanosis or edema

## 2020-11-24 NOTE — CONSULT NOTE ADULT - SUBJECTIVE AND OBJECTIVE BOX
Reason for consult: colon cancer     HPI:  70F with PMH of HTN, neuropathic pain, stage 4 colon cancer (initially dx 2004 s/p hemicolectomy, with recurrence in past years with mets to the bladder c/b hydronephrosis s/p R ureteral stent last exchanged 2020) last chemo with folfox on 10/19/20  p/w abdominal pain. Patient starting having lower abdominal pain yesterday and was told to come in to the ER for eval. Ct abd shows Status post right hemicolectomy with ileocolic anastomosis. High attenuating mesenteric hematoma with hematocrit level seen within the bowel mesentery measuring 5.2 x 3.2 cm. There is associated infiltration and edema within the bowel mesentery. No bowel obstruction.    Patient states she feels well. has no active abdominal pain. Denies any other bleeding.     PAST MEDICAL & SURGICAL HISTORY:  Hydronephrosis    DVT, lower extremity    Colon cancer    HTN (hypertension)    Lumbar herniated disc    Osteoporosis    H/O hemicolectomy    H/O colonoscopy     delivery delivered  x 2 -  and         FAMILY HISTORY:  No pertinent family history in first degree relatives        Alochol: Denied  Smoking: Nonsmoker  Drug Use: Denied  Marital Status:         Allergies    No Known Allergies    Intolerances        MEDICATIONS  (STANDING):  amLODIPine   Tablet 5 milliGRAM(s) Oral daily  cefTRIAXone   IVPB 1000 milliGRAM(s) IV Intermittent every 24 hours  chlorhexidine 4% Liquid 1 Application(s) Topical daily  DULoxetine 60 milliGRAM(s) Oral daily  metoprolol succinate ER 25 milliGRAM(s) Oral daily  sodium chloride 0.9%. 1000 milliLiter(s) (75 mL/Hr) IV Continuous <Continuous>    MEDICATIONS  (PRN):  acetaminophen   Tablet .. 650 milliGRAM(s) Oral every 6 hours PRN Temp greater or equal to 38C (100.4F), Mild Pain (1 - 3)  morphine  - Injectable 2 milliGRAM(s) IV Push every 6 hours PRN Severe Pain (7 - 10)  oxyCODONE    IR 5 milliGRAM(s) Oral every 6 hours PRN Moderate Pain (4 - 6)      ROS:     General:  No wt loss, fevers, chills, night sweats, fatigue,   Eyes:  Good vision, no reported pain  ENT:  No sore throat, pain, runny nose, dysphagia  CV:  No pain, palpitations, hypo/hypertension  Resp:  No dyspnea, cough, tachypnea, wheezing  GI:  + abdominal pain   :  No pain, bleeding, incontinence, nocturia  Muscle:  No pain, weakness  Neuro:  No weakness, tingling, memory problems  Psych:  No fatigue, insomnia, mood problems, depression  Endocrine:  No polyuria, polydipsia, cold/heat intolerance  Heme:  No petechiae, ecchymosis, easy bruisability        PHYSICAL EXAM:     GENERAL:  Appears stated age, well-groomed  HEENT:  NC/AT,    CHEST:  CTA b/l   HEART:  s1s2+   ABDOMEN:  Soft, + tenderness in lower abdomen   EXTEREMITIES:  no cyanosis,clubbing or edema  SKIN:  No rash/erythema/ecchymoses  NEURO:  Alert, oriented, no asterixis                            7.7    11.38 )-----------( 128      ( 2020 06:40 )             23.1           143  |  108  |  98<H>  ----------------------------<  94  4.9   |  17<L>  |  6.36<H>    Ca    8.8      2020 06:40  Phos  6.1       Mg     1.8         TPro  6.8  /  Alb  3.9  /  TBili  0.3  /  DBili  x   /  AST  20  /  ALT  22  /  AlkPhos  108

## 2020-11-24 NOTE — PROGRESS NOTE ADULT - ASSESSMENT
70F on therapeutic with metastatic colon cancer presents with one day of abdominal pain and found to have a mesenteric hematoma on CT scan.     -would recommend bowel rest, NPO/IVF,  transfusions and serial H/H  -no operative intervention indicated at this time  -if patient does not respond appropriately to blood transfusions then would recommend IR consultation for angiography and possible embolizations  -will follow with you  -further plans to be discussed with attendings on rounds     page [phone] with surgical questions

## 2020-11-24 NOTE — PROVIDER CONTACT NOTE (CRITICAL VALUE NOTIFICATION) - BACKGROUND
Admitted for hemoperitoneum. PMHx of hydronephrosis, DVT, Coln ca, and HTN. 1 unit PRBC transfused 11/23.

## 2020-11-24 NOTE — PROGRESS NOTE ADULT - SUBJECTIVE AND OBJECTIVE BOX
SUBJECTIVE / OVERNIGHT EVENTS: pt denies abd pain , + flatus, denies nausea, v      MEDICATIONS  (STANDING):  amLODIPine   Tablet 5 milliGRAM(s) Oral daily  cefTRIAXone   IVPB 1000 milliGRAM(s) IV Intermittent every 24 hours  chlorhexidine 4% Liquid 1 Application(s) Topical daily  dextrose 5% + sodium chloride 0.9%. 1000 milliLiter(s) (75 mL/Hr) IV Continuous <Continuous>  DULoxetine 60 milliGRAM(s) Oral daily  metoprolol succinate ER 25 milliGRAM(s) Oral daily    MEDICATIONS  (PRN):  acetaminophen   Tablet .. 650 milliGRAM(s) Oral every 6 hours PRN Temp greater or equal to 38C (100.4F), Mild Pain (1 - 3)  morphine  - Injectable 2 milliGRAM(s) IV Push every 6 hours PRN Severe Pain (7 - 10)  oxyCODONE    IR 5 milliGRAM(s) Oral every 6 hours PRN Moderate Pain (4 - 6)    Vital Signs Last 24 Hrs  T(C): 36.9 (2020 21:12), Max: 37.1 (2020 23:55)  T(F): 98.4 (2020 21:12), Max: 98.8 (2020 23:55)  HR: 84 (2020 21:12) (77 - 108)  BP: 163/76 (2020 21:12) (130/79 - 166/97)  BP(mean): 107 (2020 23:55) (107 - 107)  RR: 18 (2020 21:12) (17 - 18)  SpO2: 96% (2020 21:12) (96% - 99%)    CAPILLARY BLOOD GLUCOSE        I&O's Summary    2020 07:01  -  2020 07:00  --------------------------------------------------------  IN: 525 mL / OUT: 650 mL / NET: -125 mL    2020 07:01  -  2020 23:33  --------------------------------------------------------  IN: 0 mL / OUT: 500 mL / NET: -500 mL        Constitutional: No fever, fatigue  Skin: No rash.  Eyes: No recent vision problems or eye pain.  ENT: No congestion, ear pain, or sore throat.  Cardiovascular: No chest pain or palpation.  Respiratory: No cough, shortness of breath, congestion, or wheezing.  Gastrointestinal: No abdominal pain, nausea, vomiting, or diarrhea.  Genitourinary: No dysuria.  Musculoskeletal: No joint swelling.  Neurologic: No headache.    PHYSICAL EXAM:  GENERAL: NAD  EYES: EOMI, PERRLA  NECK: Supple, No JVD  CHEST/LUNG: dec breath sounds at bases   HEART:  S1 , S2 +  ABDOMEN: soft , bs+, mild distension , no tenderness, no guarding   EXTREMITIES:  no edema  NEUROLOGY:alert awake      LABS:                        7.1    10.62 )-----------( 118      ( 2020 21:13 )             21.0     11-    143  |  108  |  98<H>  ----------------------------<  94  4.9   |  17<L>  |  6.36<H>    Ca    8.8      2020 06:40  Phos  6.1     11-24  Mg     1.8     -24    TPro  6.8  /  Alb  3.9  /  TBili  0.3  /  DBili  x   /  AST  20  /  ALT  22  /  AlkPhos  108  11-24    PT/INR - ( 2020 14:23 )   PT: 11.7 sec;   INR: 0.97 ratio         PTT - ( 2020 14:23 )  PTT:29.4 sec      Urinalysis Basic - ( 2020 17:16 )    Color: Light Yellow / Appearance: Slightly Turbid / S.012 / pH: x  Gluc: x / Ketone: Negative  / Bili: Negative / Urobili: Negative   Blood: x / Protein: 30 mg/dL / Nitrite: Negative   Leuk Esterase: Moderate / RBC: 9 /hpf / WBC 27 /HPF   Sq Epi: x / Non Sq Epi: 1 /hpf / Bacteria: Negative        RADIOLOGY & ADDITIONAL TESTS:    Imaging Personally Reviewed:    Consultant(s) Notes Reviewed:      Care Discussed with Consultants/Other Providers:

## 2020-11-24 NOTE — PROGRESS NOTE ADULT - ASSESSMENT
metastatic adeno ca to mesentary, on a/c for clot "in neck"  I think it is unlikely that ir will be able to adress issue.  a/c is contraindicated at this point. hgb stable. follow h/h  pt states her daughter is getting  next weekend.

## 2020-11-24 NOTE — PROGRESS NOTE ADULT - SUBJECTIVE AND OBJECTIVE BOX
SUBJECTIVE:  Reports pain well controlled. NPO Denies N/V. Bowel Function ++  Ambulating independently       OBJECTIVE:  Vital Signs Last 24 Hrs  T(C): 37.1 (23 Nov 2020 23:55), Max: 37.2 (23 Nov 2020 22:12)  T(F): 98.8 (23 Nov 2020 23:55), Max: 99 (23 Nov 2020 22:12)  HR: 100 (23 Nov 2020 23:55) (99 - 111)  BP: 154/84 (23 Nov 2020 23:55) (154/84 - 177/84)  BP(mean): 107 (23 Nov 2020 23:55) (107 - 107)  RR: 17 (23 Nov 2020 23:55) (17 - 18)  SpO2: 99% (23 Nov 2020 23:55) (98% - 100%)    Physical Examination:  GEN: NAD, resting quietly  PULM: symmetric chest rise bilaterally, no increased WOB  ABD: soft, nontender, nondistended  EXTR: no LE erythema, moving all extremities      LABS:                        7.8    11.55 )-----------( 121      ( 23 Nov 2020 23:28 )             23.2       11-23    142  |  107  |  104<H>  ----------------------------<  119<H>  5.0   |  16<L>  |  6.55<H>    Ca    8.8      23 Nov 2020 17:15    TPro  7.2  /  Alb  4.1  /  TBili  0.2  /  DBili  x   /  AST  22  /  ALT  27  /  AlkPhos  132<H>  11-23

## 2020-11-24 NOTE — PROGRESS NOTE ADULT - ASSESSMENT
70 y old with abdominal pain and bilateral hydro.  I reviewed CT scan, labs, surgery note.     - R hydro with severe cortical thinning - minimal function   - L hydro appears chronic with mild cortical thinning - large bore ureteral stent - 4 mm in diam/ 10-12 F with patent lumen on bone window in appropriate position    Pt made 650 ml of urine over night     considering poorly/non-functioning R kidney most of urine from L kidney hence obstruction not likely     CR elevation most likely chronic but I would rec obtaining most recent labs from pt primary onc team     No reason for stent exchange     Follow urine culture    Strict IO   Serial Cr and K, Na    Bladder mass - pt has primary outside urologist - she can follow with her urologist after discharge for management       total time 40 min , > 50% spend coordinating care

## 2020-11-24 NOTE — CONSULT NOTE ADULT - ASSESSMENT
70F with PMH of HTN, neuropathic pain, stage 4 colon cancer (initially dx 2004 s/p hemicolectomy, with recurrence in past years with mets to the bladder c/b hydronephrosis s/p R ureteral stent last exchanged 9/2020) last chemo with folfox on 10/19/20  p/w abdominal pain. Patient starting having lower abdominal pain yesterday and was told to come in to the ER for eval. Ct abd shows Status post right hemicolectomy with ileocolic anastomosis. High attenuating mesenteric hematoma with hematocrit level seen within the bowel mesentery measuring 5.2 x 3.2 cm. There is associated infiltration and edema within the bowel mesentery. No bowel obstruction.    1, Abdominal pain  - pt found to have mesenteric hematoma   - surgery follow up recommended observation .  - hg on presentation was 6.4 s/p PRBC today hg is 7.4   - would check stool guiac   - follow up with surgery reccs     2. Anemia   - macrocytic anemia   - would recommend b12 folate LDH haptoglobin spep   - iron studies would be futile at this time as pt s/p prbc   - monitor cbc closely     3. Colon cancer   - [place] [facility] [place]     Maldonado Chacon MD  Hematology Oncology   [phone] 70F with PMH of HTN, neuropathic pain, stage 4 colon cancer (initially dx 2004 s/p hemicolectomy, with recurrence in past years with mets to the bladder c/b hydronephrosis s/p R ureteral stent last exchanged 9/2020) last chemo with folfox on 10/19/20  p/w abdominal pain. Patient starting having lower abdominal pain yesterday and was told to come in to the ER for eval. Ct abd shows Status post right hemicolectomy with ileocolic anastomosis. High attenuating mesenteric hematoma with hematocrit level seen within the bowel mesentery measuring 5.2 x 3.2 cm. There is associated infiltration and edema within the bowel mesentery. No bowel obstruction.    1, Abdominal pain  - pt found to have mesenteric hematoma   - surgery follow up recommended observation .  - hg on presentation was 6.4 s/p PRBC today hg is 7.4   - would check stool guiac   - follow up with surgery reccs     2. Anemia   - macrocytic anemia   - would recommend b12 folate LDH haptoglobin spep   - iron studies would be futile at this time as pt s/p prbc   - monitor cbc closely     3. Colon cancer   - [place] [facility] [place]     4. Elevated Cr  - pt with b/l hydro with stents   - pt known abnormal cr   - has urologist on the outside.     5. Bladder mass   - unclear if this is new   - [place] [facility] [place]         Maldonado Chacon MD  Hematology Oncology   [phone]

## 2020-11-24 NOTE — CONSULT NOTE ADULT - SUBJECTIVE AND OBJECTIVE BOX
[facility] NEPHROLOGY- CONSULTATION NOTE    Patient is a 70y Female with baseline creatinine 2.0 (last taken on  at outpatient oncology) with metastatic colon cancer who presented to the hospital abdominal pain and was found to have mesenteric hematoma with severe anemia.  Her creatinine was elevated.  She has known bladder mass.  She has chronic L hydronephrosis with large ureteral stent that appears patent.  R severe hydronephrosis with severe cortical thinning, likely non-functional.  Pt has been making urine and has post void residual of ~ 120ml (per nursing).  No alberts catheter.    Pt has trouble giving complete history, and much of it was obtained from chart.    PAST MEDICAL & SURGICAL HISTORY:  Hydronephrosis    DVT, lower extremity    Colon cancer    HTN (hypertension)    Lumbar herniated disc    Osteoporosis    H/O hemicolectomy    H/O colonoscopy     delivery delivered  x 2 -  and   CKD    No Known Allergies    Home Medications Reviewed  Hospital Medications:   MEDICATIONS  (STANDING):  amLODIPine   Tablet 5 milliGRAM(s) Oral daily  cefTRIAXone   IVPB 1000 milliGRAM(s) IV Intermittent every 24 hours  chlorhexidine 4% Liquid 1 Application(s) Topical daily  dextrose 5% + sodium chloride 0.9%. 1000 milliLiter(s) (75 mL/Hr) IV Continuous <Continuous>  DULoxetine 60 milliGRAM(s) Oral daily  metoprolol succinate ER 25 milliGRAM(s) Oral daily    SOCIAL HISTORY:  Denies ETOh,Smoking,   FAMILY HISTORY:  No pertinent family history in first degree relatives      REVIEW OF SYSTEMS:  CONSTITUTIONAL: No weakness, fevers or chills  EYES/ENT: No visual changes;  No vertigo or throat pain   NECK: No pain or stiffness  RESPIRATORY: No cough, wheezing, hemoptysis; No shortness of breath  CARDIOVASCULAR: No chest pain or palpitations.  GASTROINTESTINAL: No abdominal or epigastric pain. No nausea, vomiting, or hematemesis; No diarrhea or constipation. No melena or hematochezia.  GENITOURINARY: No dysuria, frequency, foamy urine, urinary urgency, incontinence or hematuria  NEUROLOGICAL: No numbness or weakness  SKIN: No itching, burning, rashes, or lesions   VASCULAR: No bilateral lower extremity edema.   All other review of systems is negative unless indicated above.    VITALS:  T(F): 98.1 (20 @ 15:52), Max: 99 (20 @ 22:12)  HR: 99 (20 @ 15:52)  BP: 130/79 (20 @ 15:52)  RR: 18 (20 @ 15:52)  SpO2: 96% (20 @ 15:52)  Wt(kg): --     @ 07:01  -   @ 07:00  --------------------------------------------------------  IN: 525 mL / OUT: 650 mL / NET: -125 mL      Height (cm): 160 (:12)  Weight (kg): 54.3 (:12)  BMI (kg/m2): 21.2 (:12)  BSA (m2): 1.55 (:12)    PHYSICAL EXAM:  Constitutional: NAD, thin  HEENT: anicteric sclera, oropharynx clear, MMM  Neck: No JVD  Respiratory: CTAB, no wheezes, rales or rhonchi  Cardiovascular: S1, S2, RRR  Gastrointestinal: BS+, soft, NT/ND  Extremities: No cyanosis or clubbing. No peripheral edema  Neurological: A/O x 3, though a bit confused about medical history, no focal deficits  Psychiatric: Normal mood, normal affect  : No CVA tenderness. No alberts. +suprapubic tenderness (moderate)  Skin: + R chest wall port benign      LABS:      143  |  108  |  98<H>  ----------------------------<  94  4.9   |  17<L>  |  6.36<H>    Ca    8.8      2020 06:40  Phos  6.1       Mg     1.8         TPro  6.8  /  Alb  3.9  /  TBili  0.3  /  DBili      /  AST  20  /  ALT  22  /  AlkPhos  108      Creatinine Trend: 6.36 <--, 6.55 <--, 6.75 <--                        8.1    12.04 )-----------( 128      ( 2020 12:28 )             24.3     Urine Studies:  Urinalysis Basic - ( 2020 17:16 )    Color: Light Yellow / Appearance: Slightly Turbid / S.012 / pH:   Gluc:  / Ketone: Negative  / Bili: Negative / Urobili: Negative   Blood:  / Protein: 30 mg/dL / Nitrite: Negative   Leuk Esterase: Moderate / RBC: 9 /hpf / WBC 27 /HPF   Sq Epi:  / Non Sq Epi: 1 /hpf / Bacteria: Negative      Creatinine, Random Urine: 48 mg/dL ( @ 02:05)  Sodium, Random Urine: 49 mmol/L ( @ 02:05)    RADIOLOGY & ADDITIONAL STUDIES:      < from: US Kidney and Bladder (20 @ 10:01) >    EXAM:  US KIDNEYS AND BLADDER                            PROCEDURE DATE:  2020            INTERPRETATION:  CLINICAL INFORMATION: Acute kidney injury. Creatinine 6.4. Bladder mass with hydronephrosis.    COMPARISON: CT abdomen and pelvis 2020.    TECHNIQUE: Sonography of the kidneys and bladder.    FINDINGS:    Right kidney: 10.5 cm. Severe hydronephrosis with parenchymal atrophy. No renal calculi or solid renal mass identified.    Left kidney: 11.1 cm. Ureteral stent visualized in the renal pelvis. Moderate hydronephrosis. No renal calculi or solid renal mass identified.    Urinary bladder: Ureteral stent visualized in the bladder. Heterogeneous posterior bladder wall mass measures 5.1 x 4.0 x 1.5 cm and demonstrates internal vascularity compatible with known bladder neoplasm.    Miscellaneous: Right pleural effusion.    IMPRESSION:    Severe right hydronephrosis.    Left ureteral stent with moderate left hydronephrosis.    Heterogeneous posterior bladder wall mass compatible with known bladder neoplasm.    Right pleural effusion.                ADI DUNHAM MD; Resident Radiology  This document has been electronically signed.  BRYCE GALAN MD; Attending Radiologist  This document has been electronically signed. 2020 10:10AM    < end of copied text >

## 2020-11-25 LAB
ANION GAP SERPL CALC-SCNC: 15 MMOL/L — SIGNIFICANT CHANGE UP (ref 5–17)
BASOPHILS # BLD AUTO: 0.03 K/UL — SIGNIFICANT CHANGE UP (ref 0–0.2)
BASOPHILS NFR BLD AUTO: 0.3 % — SIGNIFICANT CHANGE UP (ref 0–2)
BUN SERPL-MCNC: 77 MG/DL — HIGH (ref 7–23)
CALCIUM SERPL-MCNC: 9.1 MG/DL — SIGNIFICANT CHANGE UP (ref 8.4–10.5)
CHLORIDE SERPL-SCNC: 113 MMOL/L — HIGH (ref 96–108)
CO2 SERPL-SCNC: 18 MMOL/L — LOW (ref 22–31)
CREAT SERPL-MCNC: 5 MG/DL — HIGH (ref 0.5–1.3)
EOSINOPHIL # BLD AUTO: 0.14 K/UL — SIGNIFICANT CHANGE UP (ref 0–0.5)
EOSINOPHIL NFR BLD AUTO: 1.3 % — SIGNIFICANT CHANGE UP (ref 0–6)
GLUCOSE SERPL-MCNC: 92 MG/DL — SIGNIFICANT CHANGE UP (ref 70–99)
HCT VFR BLD CALC: 29 % — LOW (ref 34.5–45)
HCT VFR BLD CALC: 29.3 % — LOW (ref 34.5–45)
HCT VFR BLD CALC: 31.2 % — LOW (ref 34.5–45)
HGB BLD-MCNC: 10.5 G/DL — LOW (ref 11.5–15.5)
HGB BLD-MCNC: 9.8 G/DL — LOW (ref 11.5–15.5)
HGB BLD-MCNC: 9.8 G/DL — LOW (ref 11.5–15.5)
IMM GRANULOCYTES NFR BLD AUTO: 0.5 % — SIGNIFICANT CHANGE UP (ref 0–1.5)
LYMPHOCYTES # BLD AUTO: 1.49 K/UL — SIGNIFICANT CHANGE UP (ref 1–3.3)
LYMPHOCYTES # BLD AUTO: 13.5 % — SIGNIFICANT CHANGE UP (ref 13–44)
MCHC RBC-ENTMCNC: 32.1 PG — SIGNIFICANT CHANGE UP (ref 27–34)
MCHC RBC-ENTMCNC: 32.2 PG — SIGNIFICANT CHANGE UP (ref 27–34)
MCHC RBC-ENTMCNC: 32.3 PG — SIGNIFICANT CHANGE UP (ref 27–34)
MCHC RBC-ENTMCNC: 33.4 GM/DL — SIGNIFICANT CHANGE UP (ref 32–36)
MCHC RBC-ENTMCNC: 33.7 GM/DL — SIGNIFICANT CHANGE UP (ref 32–36)
MCHC RBC-ENTMCNC: 33.8 GM/DL — SIGNIFICANT CHANGE UP (ref 32–36)
MCV RBC AUTO: 95.4 FL — SIGNIFICANT CHANGE UP (ref 80–100)
MCV RBC AUTO: 96 FL — SIGNIFICANT CHANGE UP (ref 80–100)
MCV RBC AUTO: 96.1 FL — SIGNIFICANT CHANGE UP (ref 80–100)
MONOCYTES # BLD AUTO: 0.9 K/UL — SIGNIFICANT CHANGE UP (ref 0–0.9)
MONOCYTES NFR BLD AUTO: 8.1 % — SIGNIFICANT CHANGE UP (ref 2–14)
NEUTROPHILS # BLD AUTO: 8.45 K/UL — HIGH (ref 1.8–7.4)
NEUTROPHILS NFR BLD AUTO: 76.3 % — SIGNIFICANT CHANGE UP (ref 43–77)
NRBC # BLD: 0 /100 WBCS — SIGNIFICANT CHANGE UP (ref 0–0)
PLATELET # BLD AUTO: 102 K/UL — LOW (ref 150–400)
PLATELET # BLD AUTO: 109 K/UL — LOW (ref 150–400)
PLATELET # BLD AUTO: 111 K/UL — LOW (ref 150–400)
POTASSIUM SERPL-MCNC: 4.1 MMOL/L — SIGNIFICANT CHANGE UP (ref 3.5–5.3)
POTASSIUM SERPL-SCNC: 4.1 MMOL/L — SIGNIFICANT CHANGE UP (ref 3.5–5.3)
RBC # BLD: 3.04 M/UL — LOW (ref 3.8–5.2)
RBC # BLD: 3.05 M/UL — LOW (ref 3.8–5.2)
RBC # BLD: 3.25 M/UL — LOW (ref 3.8–5.2)
RBC # FLD: 18.1 % — HIGH (ref 10.3–14.5)
RBC # FLD: 18.5 % — HIGH (ref 10.3–14.5)
RBC # FLD: 18.5 % — HIGH (ref 10.3–14.5)
SARS-COV-2 IGG SERPL QL IA: NEGATIVE — SIGNIFICANT CHANGE UP
SARS-COV-2 IGM SERPL IA-ACNC: <0.1 INDEX — SIGNIFICANT CHANGE UP
SODIUM SERPL-SCNC: 146 MMOL/L — HIGH (ref 135–145)
WBC # BLD: 11.06 K/UL — HIGH (ref 3.8–10.5)
WBC # BLD: 11.42 K/UL — HIGH (ref 3.8–10.5)
WBC # BLD: 13.05 K/UL — HIGH (ref 3.8–10.5)
WBC # FLD AUTO: 11.06 K/UL — HIGH (ref 3.8–10.5)
WBC # FLD AUTO: 11.42 K/UL — HIGH (ref 3.8–10.5)
WBC # FLD AUTO: 13.05 K/UL — HIGH (ref 3.8–10.5)

## 2020-11-25 PROCEDURE — 93970 EXTREMITY STUDY: CPT | Mod: 26

## 2020-11-25 RX ADMIN — Medication 650 MILLIGRAM(S): at 17:37

## 2020-11-25 RX ADMIN — CHLORHEXIDINE GLUCONATE 1 APPLICATION(S): 213 SOLUTION TOPICAL at 12:00

## 2020-11-25 RX ADMIN — DULOXETINE HYDROCHLORIDE 60 MILLIGRAM(S): 30 CAPSULE, DELAYED RELEASE ORAL at 13:11

## 2020-11-25 RX ADMIN — Medication 650 MILLIGRAM(S): at 17:52

## 2020-11-25 RX ADMIN — CEFTRIAXONE 100 MILLIGRAM(S): 500 INJECTION, POWDER, FOR SOLUTION INTRAMUSCULAR; INTRAVENOUS at 17:37

## 2020-11-25 RX ADMIN — AMLODIPINE BESYLATE 5 MILLIGRAM(S): 2.5 TABLET ORAL at 05:54

## 2020-11-25 RX ADMIN — Medication 25 MILLIGRAM(S): at 05:54

## 2020-11-25 NOTE — PROGRESS NOTE ADULT - SUBJECTIVE AND OBJECTIVE BOX
SUBJECTIVE / OVERNIGHT EVENTS: pt denies abd pain , + flatus, denies nausea, v    MEDICATIONS  (STANDING):  amLODIPine   Tablet 5 milliGRAM(s) Oral daily  cefTRIAXone   IVPB 1000 milliGRAM(s) IV Intermittent every 24 hours  chlorhexidine 4% Liquid 1 Application(s) Topical daily  dextrose 5% + sodium chloride 0.9%. 1000 milliLiter(s) (75 mL/Hr) IV Continuous <Continuous>  DULoxetine 60 milliGRAM(s) Oral daily  metoprolol succinate ER 25 milliGRAM(s) Oral daily    MEDICATIONS  (PRN):  acetaminophen   Tablet .. 650 milliGRAM(s) Oral every 6 hours PRN Temp greater or equal to 38C (100.4F), Mild Pain (1 - 3)  morphine  - Injectable 2 milliGRAM(s) IV Push every 6 hours PRN Severe Pain (7 - 10)  oxyCODONE    IR 5 milliGRAM(s) Oral every 6 hours PRN Moderate Pain (4 - 6)    Vital Signs Last 24 Hrs  T(C): 36.8 (2020 21:11), Max: 36.9 (2020 08:03)  T(F): 98.3 (2020 21:11), Max: 98.4 (2020 08:03)  HR: 87 (2020 21:11) (80 - 88)  BP: 146/82 (2020 21:11) (146/82 - 171/90)  BP(mean): --  RR: 18 (2020 21:11) (18 - 18)  SpO2: 96% (:11) (96% - 99%)    Constitutional: No fever, fatigue  Skin: No rash.  Eyes: No recent vision problems or eye pain.  ENT: No congestion, ear pain, or sore throat.  Cardiovascular: No chest pain or palpation.  Respiratory: No cough, shortness of breath, congestion, or wheezing.  Gastrointestinal: No abdominal pain, nausea, vomiting, or diarrhea.  Genitourinary: No dysuria.  Musculoskeletal: No joint swelling.  Neurologic: No headache.    PHYSICAL EXAM:  GENERAL: NAD  EYES: EOMI, PERRLA  NECK: Supple, No JVD  CHEST/LUNG: dec breath sounds at bases   HEART:  S1 , S2 +  ABDOMEN: soft , bs+, mild distension , no tenderness, no guarding   EXTREMITIES:  no edema  NEUROLOGY:alert awake      LABS:  11-25    146<H>  |  113<H>  |  77<H>  ----------------------------<  92  4.1   |  18<L>  |  5.00<H>    Ca    9.1      2020 06:06  Phos  6.1       Mg     1.8         TPro  6.8  /  Alb  3.9  /  TBili  0.3  /  DBili      /  AST  20  /  ALT  22  /  AlkPhos  108      Creatinine Trend: 5.00 <--, 6.36 <--, 6.55 <--, 6.75 <--                        10.5   13.05 )-----------( 111      ( 2020 17:51 )             31.2     Urine Studies:  Urinalysis Basic - ( 2020 17:16 )    Color: Light Yellow / Appearance: Slightly Turbid / S.012 / pH:   Gluc:  / Ketone: Negative  / Bili: Negative / Urobili: Negative   Blood:  / Protein: 30 mg/dL / Nitrite: Negative   Leuk Esterase: Moderate / RBC: 9 /hpf / WBC 27 /HPF   Sq Epi:  / Non Sq Epi: 1 /hpf / Bacteria: Negative      Creatinine, Random Urine: 48 mg/dL ( @ 02:05)  Sodium, Random Urine: 49 mmol/L ( @ 02:05)          LIVER FUNCTIONS - ( 2020 06:40 )  Alb: 3.9 g/dL / Pro: 6.8 g/dL / ALK PHOS: 108 U/L / ALT: 22 U/L / AST: 20 U/L / GGT: x

## 2020-11-25 NOTE — PROGRESS NOTE ADULT - ASSESSMENT
70 year-old woman with LEONEL, etiology ATN due to severe anemia/dehydration vs. obstruction.    1. LEONEL- ddx as above.  Continue IVF. F/u urology. Renal fxn staring to improve. If renal fxn does not improve further, will have to discuss GOC re: RRT.  2. CKD4- in the setting of known colon cancer s/p chemotherapy.  3. Chronic obstruction. F/u urology.  4. HTN- BP a bit elevated.  Monitor for now. Continue current antihypertensive medication.

## 2020-11-25 NOTE — PROGRESS NOTE ADULT - SUBJECTIVE AND OBJECTIVE BOX
Interval events: recieved 1 upRBC @1730    SUBJECTIVE:  Reports pain well controlled. NPO. Denies N/V. Bowel Function +/+  Ambulating independently       OBJECTIVE:  Vital Signs Last 24 Hrs  T(C): 36.7 (25 Nov 2020 00:45), Max: 37 (24 Nov 2020 08:04)  T(F): 98 (25 Nov 2020 00:45), Max: 98.6 (24 Nov 2020 08:04)  HR: 84 (25 Nov 2020 00:45) (77 - 108)  BP: 154/83 (25 Nov 2020 00:45) (130/79 - 166/97)  BP(mean): --  RR: 18 (25 Nov 2020 00:45) (18 - 18)  SpO2: 97% (25 Nov 2020 00:45) (96% - 98%)    Physical Examination:  GEN: NAD, resting quietly  PULM: symmetric chest rise bilaterally, no increased WOB  ABD: soft, nontender, nondistended, incision CDI  EXTR: no LE erythema, moving all extremities      LABS:                        7.1    10.62 )-----------( 118      ( 24 Nov 2020 21:13 )             21.0       11-24    143  |  108  |  98<H>  ----------------------------<  94  4.9   |  17<L>  |  6.36<H>    Ca    8.8      24 Nov 2020 06:40  Phos  6.1     11-24  Mg     1.8     11-24    TPro  6.8  /  Alb  3.9  /  TBili  0.3  /  DBili  x   /  AST  20  /  ALT  22  /  AlkPhos  108  11-24           Interval events: recieved 1 upRBC @1730    SUBJECTIVE:  Reports pain well controlled. Was anemic yesterday received 1 u pRBC, NPO. Denies N/V. Bowel Function +/+, reports being hungry  Ambulating independently       OBJECTIVE:  Vital Signs Last 24 Hrs  T(C): 36.7 (25 Nov 2020 00:45), Max: 37 (24 Nov 2020 08:04)  T(F): 98 (25 Nov 2020 00:45), Max: 98.6 (24 Nov 2020 08:04)  HR: 84 (25 Nov 2020 00:45) (77 - 108)  BP: 154/83 (25 Nov 2020 00:45) (130/79 - 166/97)  BP(mean): --  RR: 18 (25 Nov 2020 00:45) (18 - 18)  SpO2: 97% (25 Nov 2020 00:45) (96% - 98%)    Physical Examination:  GEN: NAD, resting quietly  PULM: symmetric chest rise bilaterally, no increased WOB  ABD: soft, nontender, nondistended, incision CDI  EXTR: no LE erythema, moving all extremities      LABS:                        7.1    10.62 )-----------( 118      ( 24 Nov 2020 21:13 )             21.0       11-24    143  |  108  |  98<H>  ----------------------------<  94  4.9   |  17<L>  |  6.36<H>    Ca    8.8      24 Nov 2020 06:40  Phos  6.1     11-24  Mg     1.8     11-24    TPro  6.8  /  Alb  3.9  /  TBili  0.3  /  DBili  x   /  AST  20  /  ALT  22  /  AlkPhos  108  11-24

## 2020-11-25 NOTE — PROGRESS NOTE ADULT - SUBJECTIVE AND OBJECTIVE BOX
Patient seen and examined at bedside. feels well. no complaints. had prbc over night.     MEDICATIONS  (STANDING):  amLODIPine   Tablet 5 milliGRAM(s) Oral daily  cefTRIAXone   IVPB 1000 milliGRAM(s) IV Intermittent every 24 hours  chlorhexidine 4% Liquid 1 Application(s) Topical daily  dextrose 5% + sodium chloride 0.9%. 1000 milliLiter(s) (75 mL/Hr) IV Continuous <Continuous>  DULoxetine 60 milliGRAM(s) Oral daily  metoprolol succinate ER 25 milliGRAM(s) Oral daily    MEDICATIONS  (PRN):  acetaminophen   Tablet .. 650 milliGRAM(s) Oral every 6 hours PRN Temp greater or equal to 38C (100.4F), Mild Pain (1 - 3)  morphine  - Injectable 2 milliGRAM(s) IV Push every 6 hours PRN Severe Pain (7 - 10)  oxyCODONE    IR 5 milliGRAM(s) Oral every 6 hours PRN Moderate Pain (4 - 6)        Vital Signs Last 24 Hrs  T(C): 36.9 (25 Nov 2020 08:03), Max: 36.9 (24 Nov 2020 21:12)  T(F): 98.4 (25 Nov 2020 08:03), Max: 98.4 (24 Nov 2020 21:12)  HR: 81 (25 Nov 2020 08:03) (80 - 99)  BP: 154/87 (25 Nov 2020 08:03) (130/79 - 171/90)  BP(mean): --  RR: 18 (25 Nov 2020 08:03) (18 - 18)  SpO2: 98% (25 Nov 2020 08:03) (96% - 98%)      PHYSICAL EXAM:     GENERAL:  Appears stated age, well-groomed  HEENT:  NC/AT,  CHEST:  CTA b/l  HEART:  S1 s2+   ABDOMEN:  Soft, mild tendeness of abdomen   EXTEREMITIES:  no cyanosis,clubbing or edema  SKIN:  No rash/erythema/ecchymoses  NEURO:  Alert, oriented, no asterixis                            9.8    11.06 )-----------( 109      ( 25 Nov 2020 06:06 )             29.3       11-25    146<H>  |  113<H>  |  77<H>  ----------------------------<  92  4.1   |  18<L>  |  5.00<H>    Ca    9.1      25 Nov 2020 06:06  Phos  6.1     11-24  Mg     1.8     11-24    TPro  6.8  /  Alb  3.9  /  TBili  0.3  /  DBili  x   /  AST  20  /  ALT  22  /  AlkPhos  108  11-24

## 2020-11-25 NOTE — PROGRESS NOTE ADULT - SUBJECTIVE AND OBJECTIVE BOX
[facility] NEPHROLOGY- CONSULTATION NOTE    Patient is a 70y Female with baseline creatinine 2.0 (last taken on  at outpatient oncology) with metastatic colon cancer who presented to the hospital abdominal pain and was found to have mesenteric hematoma with severe anemia.  Her creatinine was elevated.  She has known bladder mass.  She has chronic L hydronephrosis with large ureteral stent that appears patent.  R severe hydronephrosis with severe cortical thinning, likely non-functional.  Pt has been making urine and has post void residual of ~ 120ml (per nursing).  No alberts catheter.    Pt feels okay. She has mild abd pain.    REVIEW OF SYSTEMS:  CONSTITUTIONAL: + weakness, no fevers or chills  EYES/ENT: No visual changes;  No vertigo or throat pain   NECK: No pain or stiffness  RESPIRATORY: No cough, wheezing, hemoptysis; No shortness of breath  CARDIOVASCULAR: No chest pain or palpitations.  GASTROINTESTINAL: + mild  abdominal pain. No nausea, vomiting, or hematemesis; No diarrhea or constipation. No melena or hematochezia.  GENITOURINARY: + hematuria, mild dysuria  NEUROLOGICAL: No numbness or weakness  VASCULAR: No bilateral lower extremity edema.       VITALS:  T(F): 98.4 (20 @ 08:03), Max: 98.4 (20 @ 21:12)  HR: 81 (20 @ 08:03)  BP: 154/87 (20 @ 08:03)  RR: 18 (20 @ 08:03)  SpO2: 98% (20 @ 08:03)  Wt(kg): --     @ 07:01  -   @ 07:00  --------------------------------------------------------  IN: 300 mL / OUT: 1050 mL / NET: -750 mL      PHYSICAL EXAM:  Constitutional: NAD, thin  HEENT: anicteric sclera, oropharynx clear, MMM  Neck: No JVD  Respiratory: CTAB, no wheezes, rales or rhonchi  Cardiovascular: S1, S2, RRR  Gastrointestinal: BS+, soft, NT/ND  Extremities: No cyanosis or clubbing. No peripheral edema  Neurological: A/O x 3, though a bit confused about medical history, no focal deficits  Psychiatric: Normal mood, normal affect  : No CVA tenderness. No alberts. +suprapubic tenderness (moderate)  Skin: + R chest wall port benign        LABS:      146<H>  |  113<H>  |  77<H>  ----------------------------<  92  4.1   |  18<L>  |  5.00<H>    Ca    9.1      2020 06:06  Phos  6.1       Mg     1.8         TPro  6.8  /  Alb  3.9  /  TBili  0.3  /  DBili      /  AST  20  /  ALT  22  /  AlkPhos  108      Creatinine Trend: 5.00 <--, 6.36 <--, 6.55 <--, 6.75 <--                        9.8    11. )-----------( 109      ( 2020 06:06 )             29.3     Urine Studies:  Urinalysis Basic - ( 2020 17:16 )    Color: Light Yellow / Appearance: Slightly Turbid / S.012 / pH:   Gluc:  / Ketone: Negative  / Bili: Negative / Urobili: Negative   Blood:  / Protein: 30 mg/dL / Nitrite: Negative   Leuk Esterase: Moderate / RBC: 9 /hpf / WBC 27 /HPF   Sq Epi:  / Non Sq Epi: 1 /hpf / Bacteria: Negative      Creatinine, Random Urine: 48 mg/dL ( @ 02:05)  Sodium, Random Urine: 49 mmol/L ( @ 02:05)              ADI DUNHAM MD; Resident Radiology  This document has been electronically signed.  BRYCE GALAN MD; Attending Radiologist  This document has been electronically signed. 2020 10:10AM    < end of copied text >

## 2020-11-25 NOTE — PROGRESS NOTE ADULT - SUBJECTIVE AND OBJECTIVE BOX
MARISOL [id number]  70yFemale  T(C): 36.9 (11-25-20 @ 08:03), Max: 36.9 (11-24-20 @ 21:12)  HR: 81 (11-25-20 @ 08:03) (80 - 99)  BP: 154/87 (11-25-20 @ 08:03) (130/79 - 171/90)  RR: 18 (11-25-20 @ 08:03) (18 - 18)  SpO2: 98% (11-25-20 @ 08:03) (96% - 98%)  Wt(kg): --  11-24 @ 07:01  -  11-25 @ 07:00  --------------------------------------------------------  IN: 300 mL / OUT: 1050 mL / NET: -750 mL      normal cephalic atraumatic  s1s2   clear to ascultation bilaterally  soft, non tender, non distended no guarding or rebound  no clubbing cyanosis or edema

## 2020-11-25 NOTE — PROGRESS NOTE ADULT - ASSESSMENT
70F on therapeutic with metastatic colon cancer presents with one day of abdominal pain and found to have a mesenteric hematoma on CT scan.     -would recommend bowel rest, NPO/IVF,  transfusions and serial H/H  -no operative intervention indicated at this time  -if patient does not respond appropriately to blood transfusions then would recommend IR consultation for angiography and possible embolizations  -will follow with you  -further plans to be discussed with attendings on rounds     page [phone] with surgical questions   70F on therapeutic with metastatic colon cancer presents with one day of abdominal pain and found to have a mesenteric hematoma on CT scan. Received 1 u pRBC for anemia with over response (7.1 -> 9.8)     Plan  -advance diet to CLD  -no operative intervention indicated at this time  -if patient does not respond appropriately to blood transfusions then would recommend IR consultation for angiography and possible embolizations  -will follow with you    page [phone] with surgical questions

## 2020-11-25 NOTE — CHART NOTE - NSCHARTNOTEFT_GEN_A_CORE
Called  By  Rn    that  patient    H/H  dropped    from  8.1/23  to  7/21.3.  Pt  received   1  unit  of  PRBC  in  the  ED .   Pt  was admitted     for  acute  blood  loss anemia  most  likely  in  the  setting  of  Mesenteric  hematoma.  Patient is a 70y old  Female who presents with a chief complaint of abdominal pain (25 Nov 2020 01:51)  Pt denies  any  tachycardia,  dark  stool  or  acute  bleeding  at this  time.  Pt  is hemodynamically  stable.      Vital Signs Last 24 Hrs  T(C): 36.7 (25 Nov 2020 04:05), Max: 37 (24 Nov 2020 08:04)  T(F): 98.1 (25 Nov 2020 04:05), Max: 98.6 (24 Nov 2020 08:04)  HR: 80 (25 Nov 2020 05:52) (77 - 99)  BP: 171/90 (25 Nov 2020 05:52) (130/79 - 171/90)  BP(mean): --  RR: 18 (25 Nov 2020 04:05) (18 - 18)  SpO2: 98% (25 Nov 2020 04:05) (96% - 98%)      Labs:                          7.1    10.62 )-----------( 118      ( 24 Nov 2020 21:13 )             21.0     11-24    143  |  108  |  98<H>  ----------------------------<  94  4.9   |  17<L>  |  6.36<H>    Ca    8.8      24 Nov 2020 06:40  Phos  6.1     11-24  Mg     1.8     11-24    TPro  6.8  /  Alb  3.9  /  TBili  0.3  /  DBili  x   /  AST  20  /  ALT  22  /  AlkPhos  108  11-24        Radiology:    Physical Exam:  General: WN/WD NAD  Neurology: A&Ox3, nonfocal, BRENNAN x 4  Head:  Normocephalic, atraumatic  Respiratory: CTA B/L  CV: RRR, S1S2, no murmur  Abdominal: Soft, NT, ND no palpable mass  MSK: No edema, + peripheral pulses, FROM all 4 extremity    Assessment & Plan:  HPI:  70F with PMH of HTN, neuropathic pain, stage 4 colon cancer (initially dx 2004 s/p hemicolectomy, with recurrence in past years with mets to the bladder c/b hydronephrosis s/p R ureteral stent last exchanged 9/2020) currently on chemo, DVT (?neck) on lovenox p/w abdominal pain. States she started having abdominal the night prior to admission, located in b/l lower quadrants, intermittent, crampy, 8/10 at its worst, non radiating, mildly improved after urination, had small BM this morning, but minimal flatus since then; has chronic nausea from cancer/chemo, not currently worse than usual, no vomiting, no blood in stool, no dysuria, hematuria, no change in urinary frequency or volume. No dizziness, lightheadedness, no CP, SOB/ELLISON, palpitations. Endorses chronic mild LE edema, currently at baseline. Rest of ROS negative    Pt follows with Dr Goldberg for chemo for stage 4 colon cancer - last chemo 4-5 weeks ago, at which time decision made to change to oral chemo, however has not received new medications yet. Had R ureteral stent placed a couple of years ago and follows with Dr Sawyer q3-5months for exchanges. Pt has required rare intermittent blood transfusions in the past. In not aware if she has kidney disease.      (23 Nov 2020 23:29)    A/P Anemia   >   I  units  PRBC    prescribed    >  F/u  with  CBC  in  the  am    >  Continue  to  monitor  pt  closely.          Follow up with Attending in AM. Called  By  Rn    that  patient    H/H  dropped    from  8.1/23  to  7/21.3.  Pt  received   1  unit  of  PRBC  in  the  ED .   Pt  was admitted     for  acute  blood  loss anemia  most  likely  in  the  setting  of  Mesenteric  hematoma.  Patient is a 70y old  Female who presents with a chief complaint of abdominal pain (25 Nov 2020 01:51)  Pt denies  any  tachycardia,  dark  stool  or  acute  bleeding  at this  time.  Pt  is hemodynamically  stable.      Vital Signs Last 24 Hrs  T(C): 36.7 (25 Nov 2020 04:05), Max: 37 (24 Nov 2020 08:04)  T(F): 98.1 (25 Nov 2020 04:05), Max: 98.6 (24 Nov 2020 08:04)  HR: 80 (25 Nov 2020 05:52) (77 - 99)  BP: 171/90 (25 Nov 2020 05:52) (130/79 - 171/90)  BP(mean): --  RR: 18 (25 Nov 2020 04:05) (18 - 18)  SpO2: 98% (25 Nov 2020 04:05) (96% - 98%)      Labs:                          7.1    10.62 )-----------( 118      ( 24 Nov 2020 21:13 )             21.0     11-24    143  |  108  |  98<H>  ----------------------------<  94  4.9   |  17<L>  |  6.36<H>    Ca    8.8      24 Nov 2020 06:40  Phos  6.1     11-24  Mg     1.8     11-24    TPro  6.8  /  Alb  3.9  /  TBili  0.3  /  DBili  x   /  AST  20  /  ALT  22  /  AlkPhos  108  11-24        Radiology:    Physical Exam:  General: WN/WD NAD  Neurology: A&Ox3, nonfocal, BRENNAN x 4  Head:  Normocephalic, atraumatic  Respiratory: CTA B/L  CV: RRR, S1S2, no murmur  Abdominal: Soft, NT, ND no palpable mass  MSK: No edema, + peripheral pulses, FROM all 4 extremity    Assessment & Plan:  HPI:  70F with PMH of HTN, neuropathic pain, stage 4 colon cancer (initially dx 2004 s/p hemicolectomy, with recurrence in past years with mets to the bladder c/b hydronephrosis s/p R ureteral stent last exchanged 9/2020) currently on chemo, DVT (?neck) on lovenox p/w abdominal pain. States she started having abdominal the night prior to admission, located in b/l lower quadrants, intermittent, crampy, 8/10 at its worst, non radiating, mildly improved after urination, had small BM this morning, but minimal flatus since then; has chronic nausea from cancer/chemo, not currently worse than usual, no vomiting, no blood in stool, no dysuria, hematuria, no change in urinary frequency or volume. No dizziness, lightheadedness, no CP, SOB/ELLISON, palpitations. Endorses chronic mild LE edema, currently at baseline. Rest of ROS negative    Pt follows with Dr Goldberg for chemo for stage 4 colon cancer - last chemo 4-5 weeks ago, at which time decision made to change to oral chemo, however has not received new medications yet. Had R ureteral stent placed a couple of years ago and follows with Dr Sawyer q3-5months for exchanges. Pt has required rare intermittent blood transfusions in the past. In not aware if she has kidney disease.      (23 Nov 2020 23:29)    A/P Anemia   >   I  units  PRBC    prescribed    >  F/u  with  CBC  in  the  am    >  Continue  to  monitor  pt  closely.    F/U  Stool  Occult  (  )    Surgery  following        Follow up with Attending in AM.

## 2020-11-25 NOTE — PROGRESS NOTE ADULT - ASSESSMENT
70F with PMH of HTN, neuropathic pain, stage 4 colon cancer (initially dx 2004 s/p hemicolectomy, with recurrence in past years with mets to the bladder c/b hydronephrosis s/p R ureteral stent last exchanged 9/2020) last chemo with folfox on 10/19/20  p/w abdominal pain. Patient starting having lower abdominal pain yesterday and was told to come in to the ER for eval. Ct abd shows Status post right hemicolectomy with ileocolic anastomosis. High attenuating mesenteric hematoma with hematocrit level seen within the bowel mesentery measuring 5.2 x 3.2 cm. There is associated infiltration and edema within the bowel mesentery. No bowel obstruction.    1, Abdominal pain  - pt found to have mesenteric hematoma   - surgery follow up recommended observation .  - hg on presentation was 6.4 s/p PRBC on admission   - had one prbc overnight due to hg drop  - follow up with surgery reccs   - may need embolization if hg continues to drop     2. Anemia   - macrocytic anemia   - would recommend b12 folate LDH haptoglobin spep   - monitor cbc closely     3. Colon cancer   - pt to follow up with [facility] after discharge     4. Elevated Cr  - pt with b/l hydro with stents   - pt known abnormal cr   - has urologist on the outside.     5. Bladder mass   - Pt had this in jan   - was biopsied and consistent with adeno   [place] [facility]     6. [place]   - reported DVT in passed   - was on lovenox   - [place] [facility]   - please repeat LE duplex   - if dvt present would need IVC filter if she continues to bleed mesenteric hematoma     Maldonado Chacon MD  Hematology Oncology   [phone]        70F with PMH of HTN, neuropathic pain, stage 4 colon cancer (initially dx 2004 s/p hemicolectomy, with recurrence in past years with mets to the bladder c/b hydronephrosis s/p R ureteral stent last exchanged 9/2020) last chemo with folfox on 10/19/20  p/w abdominal pain. Patient starting having lower abdominal pain yesterday and was told to come in to the ER for eval. Ct abd shows Status post right hemicolectomy with ileocolic anastomosis. High attenuating mesenteric hematoma with hematocrit level seen within the bowel mesentery measuring 5.2 x 3.2 cm. There is associated infiltration and edema within the bowel mesentery. No bowel obstruction.    1, Abdominal pain  - pt found to have mesenteric hematoma   - surgery follow up recommended observation .  - hg on presentation was 6.4 s/p PRBC on admission   - had one prbc overnight due to hg drop  - follow up with surgery reccs   - may need embolization if hg continues to drop     2. Anemia   - macrocytic anemia   - would recommend b12 folate LDH haptoglobin spep   - monitor cbc closely     3. Colon cancer   - pt to follow up with [facility] [place] after discharge     4. Elevated Cr  - pt with b/l hydro with stents   - pt known abnormal cr   - has urologist on the outside.     5. Bladder mass   - Pt had this in jan   - was biopsied and consistent with adeno   - will speak to [facility] [place]     6. catheter assoc DVT   - reported catheter associated DVT on 4/17/20  - was on lovenox 50 mg daily   - will get record from [facility]   - will likely not need any further ac   - will confirm with [facility]  - if dvt present would need IVC filter if she continues to bleed mesenteric hematoma     Maldonado Chacon MD  Hematology Oncology   [phone]

## 2020-11-25 NOTE — PROGRESS NOTE ADULT - ASSESSMENT
spontatneios hemoperitoneum, likley secondary to tumor invasion and a/c.  a/c is contraidncated at this point. hgb stable

## 2020-11-26 ENCOUNTER — TRANSCRIPTION ENCOUNTER (OUTPATIENT)
Age: 70
End: 2020-11-26

## 2020-11-26 LAB
ANION GAP SERPL CALC-SCNC: 13 MMOL/L — SIGNIFICANT CHANGE UP (ref 5–17)
BUN SERPL-MCNC: 55 MG/DL — HIGH (ref 7–23)
CALCIUM SERPL-MCNC: 8.7 MG/DL — SIGNIFICANT CHANGE UP (ref 8.4–10.5)
CHLORIDE SERPL-SCNC: 109 MMOL/L — HIGH (ref 96–108)
CO2 SERPL-SCNC: 19 MMOL/L — LOW (ref 22–31)
CREAT SERPL-MCNC: 3.74 MG/DL — HIGH (ref 0.5–1.3)
GLUCOSE SERPL-MCNC: 89 MG/DL — SIGNIFICANT CHANGE UP (ref 70–99)
HCT VFR BLD CALC: 28.9 % — LOW (ref 34.5–45)
HCT VFR BLD CALC: 30.9 % — LOW (ref 34.5–45)
HCT VFR BLD CALC: 31.4 % — LOW (ref 34.5–45)
HCT VFR BLD CALC: 33.9 % — LOW (ref 34.5–45)
HGB BLD-MCNC: 10.4 G/DL — LOW (ref 11.5–15.5)
HGB BLD-MCNC: 10.6 G/DL — LOW (ref 11.5–15.5)
HGB BLD-MCNC: 11.3 G/DL — LOW (ref 11.5–15.5)
HGB BLD-MCNC: 9.7 G/DL — LOW (ref 11.5–15.5)
MCHC RBC-ENTMCNC: 32 PG — SIGNIFICANT CHANGE UP (ref 27–34)
MCHC RBC-ENTMCNC: 32.3 PG — SIGNIFICANT CHANGE UP (ref 27–34)
MCHC RBC-ENTMCNC: 32.4 PG — SIGNIFICANT CHANGE UP (ref 27–34)
MCHC RBC-ENTMCNC: 32.8 PG — SIGNIFICANT CHANGE UP (ref 27–34)
MCHC RBC-ENTMCNC: 33.1 GM/DL — SIGNIFICANT CHANGE UP (ref 32–36)
MCHC RBC-ENTMCNC: 33.3 GM/DL — SIGNIFICANT CHANGE UP (ref 32–36)
MCHC RBC-ENTMCNC: 33.6 GM/DL — SIGNIFICANT CHANGE UP (ref 32–36)
MCHC RBC-ENTMCNC: 34.3 GM/DL — SIGNIFICANT CHANGE UP (ref 32–36)
MCV RBC AUTO: 95.7 FL — SIGNIFICANT CHANGE UP (ref 80–100)
MCV RBC AUTO: 96.3 FL — SIGNIFICANT CHANGE UP (ref 80–100)
MCV RBC AUTO: 96.6 FL — SIGNIFICANT CHANGE UP (ref 80–100)
MCV RBC AUTO: 97.1 FL — SIGNIFICANT CHANGE UP (ref 80–100)
NRBC # BLD: 0 /100 WBCS — SIGNIFICANT CHANGE UP (ref 0–0)
PLATELET # BLD AUTO: 101 K/UL — LOW (ref 150–400)
PLATELET # BLD AUTO: 109 K/UL — LOW (ref 150–400)
PLATELET # BLD AUTO: 115 K/UL — LOW (ref 150–400)
PLATELET # BLD AUTO: 122 K/UL — LOW (ref 150–400)
POTASSIUM SERPL-MCNC: 3.7 MMOL/L — SIGNIFICANT CHANGE UP (ref 3.5–5.3)
POTASSIUM SERPL-SCNC: 3.7 MMOL/L — SIGNIFICANT CHANGE UP (ref 3.5–5.3)
RBC # BLD: 3 M/UL — LOW (ref 3.8–5.2)
RBC # BLD: 3.23 M/UL — LOW (ref 3.8–5.2)
RBC # BLD: 3.25 M/UL — LOW (ref 3.8–5.2)
RBC # BLD: 3.49 M/UL — LOW (ref 3.8–5.2)
RBC # FLD: 17.8 % — HIGH (ref 10.3–14.5)
RBC # FLD: 18.1 % — HIGH (ref 10.3–14.5)
RBC # FLD: 18.2 % — HIGH (ref 10.3–14.5)
RBC # FLD: 18.3 % — HIGH (ref 10.3–14.5)
SODIUM SERPL-SCNC: 141 MMOL/L — SIGNIFICANT CHANGE UP (ref 135–145)
WBC # BLD: 10.55 K/UL — HIGH (ref 3.8–10.5)
WBC # BLD: 10.78 K/UL — HIGH (ref 3.8–10.5)
WBC # BLD: 12.1 K/UL — HIGH (ref 3.8–10.5)
WBC # BLD: 13.12 K/UL — HIGH (ref 3.8–10.5)
WBC # FLD AUTO: 10.55 K/UL — HIGH (ref 3.8–10.5)
WBC # FLD AUTO: 10.78 K/UL — HIGH (ref 3.8–10.5)
WBC # FLD AUTO: 12.1 K/UL — HIGH (ref 3.8–10.5)
WBC # FLD AUTO: 13.12 K/UL — HIGH (ref 3.8–10.5)

## 2020-11-26 RX ADMIN — AMLODIPINE BESYLATE 5 MILLIGRAM(S): 2.5 TABLET ORAL at 05:33

## 2020-11-26 RX ADMIN — DULOXETINE HYDROCHLORIDE 60 MILLIGRAM(S): 30 CAPSULE, DELAYED RELEASE ORAL at 09:03

## 2020-11-26 RX ADMIN — Medication 650 MILLIGRAM(S): at 18:06

## 2020-11-26 RX ADMIN — CEFTRIAXONE 100 MILLIGRAM(S): 500 INJECTION, POWDER, FOR SOLUTION INTRAMUSCULAR; INTRAVENOUS at 18:06

## 2020-11-26 RX ADMIN — Medication 25 MILLIGRAM(S): at 05:33

## 2020-11-26 RX ADMIN — CHLORHEXIDINE GLUCONATE 1 APPLICATION(S): 213 SOLUTION TOPICAL at 09:04

## 2020-11-26 RX ADMIN — Medication 650 MILLIGRAM(S): at 18:33

## 2020-11-26 NOTE — PROGRESS NOTE ADULT - ASSESSMENT
70F on therapeutic with metastatic colon cancer presents with one day of abdominal pain and found to have a mesenteric hematoma on CT scan. Received 1 u pRBC for anemia with over response (7.1 -> 9.8)     Plan  -CLD, would advance if tolerating  -no operative intervention indicated at this time  -if patient does not respond appropriately to blood transfusions then would recommend IR consultation for angiography and possible embolizations  -will follow with you  -Final plan to be d/w attending in AM rounds    [facility] [id number] 70F on therapeutic with metastatic colon cancer presents with one day of abdominal pain and found to have a mesenteric hematoma on CT scan. Received 1 u pRBC for anemia with over response (7.1 -> 9.8)     Plan  -CLD, would advance if tolerating  -no operative intervention indicated at this time  -if patient does not respond appropriately to blood transfusions then would recommend IR consultation for angiography and possible embolizations  -patient's H/H remains stable, would not be contraindicated to discharge patient at this time with heme onc follow up for cancer and AC  -would continue holding AC  -call if any questions    [facility] [id number]

## 2020-11-26 NOTE — PROGRESS NOTE ADULT - SUBJECTIVE AND OBJECTIVE BOX
Patient seen and examined at bedside. pt feels well. has slight abd pain. hg stable     MEDICATIONS  (STANDING):  amLODIPine   Tablet 5 milliGRAM(s) Oral daily  cefTRIAXone   IVPB 1000 milliGRAM(s) IV Intermittent every 24 hours  chlorhexidine 4% Liquid 1 Application(s) Topical daily  dextrose 5% + sodium chloride 0.9%. 1000 milliLiter(s) (75 mL/Hr) IV Continuous <Continuous>  DULoxetine 60 milliGRAM(s) Oral daily  metoprolol succinate ER 25 milliGRAM(s) Oral daily    MEDICATIONS  (PRN):  acetaminophen   Tablet .. 650 milliGRAM(s) Oral every 6 hours PRN Temp greater or equal to 38C (100.4F), Mild Pain (1 - 3)  morphine  - Injectable 2 milliGRAM(s) IV Push every 6 hours PRN Severe Pain (7 - 10)  oxyCODONE    IR 5 milliGRAM(s) Oral every 6 hours PRN Moderate Pain (4 - 6)        Vital Signs Last 24 Hrs  T(C): 36.7 (26 Nov 2020 08:43), Max: 36.9 (25 Nov 2020 12:02)  T(F): 98 (26 Nov 2020 08:43), Max: 98.4 (25 Nov 2020 12:02)  HR: 88 (26 Nov 2020 08:43) (79 - 88)  BP: 144/87 (26 Nov 2020 08:43) (144/87 - 162/91)  BP(mean): --  RR: 18 (26 Nov 2020 08:43) (18 - 18)  SpO2: 99% (26 Nov 2020 08:43) (96% - 99%)      PHYSICAL EXAM:     GENERAL:  Appears stated age, well-groomed  HEENT:  NC/AT,  CHEST:  CTA b/l  HEART:  S1 s2+   ABDOMEN:  Soft, non-tender, non-distended  EXTEREMITIES:  no cyanosis,clubbing or edema  SKIN:  No rash/erythema/ecchymoses                              10.6   10.78 )-----------( 109      ( 26 Nov 2020 05:59 )             30.9       11-26    141  |  109<H>  |  55<H>  ----------------------------<  89  3.7   |  19<L>  |  3.74<H>    Ca    8.7      26 Nov 2020 05:59

## 2020-11-26 NOTE — PROGRESS NOTE ADULT - ASSESSMENT
70 year-old woman with LEONEL, etiology most likely due to ATN, now improving significantly.     1. LEONEL likely 2/2 ATN-  Continue IVF. Continue urology f/u. Monitor renal fxn.  2. CKD4- in the setting of known colon cancer s/p chemotherapy.  3. Chronic obstruction. F/u urology.  4. HTN- BP acceptable. Continue with current anti-hypertensive medications. Monitor BP.

## 2020-11-26 NOTE — PROGRESS NOTE ADULT - SUBJECTIVE AND OBJECTIVE BOX
[facility] NEPHROLOGY- PROGRESS NOTE    Patient is a 70y Female with baseline creatinine 2.0 (last taken on  at outpatient oncology) with metastatic colon cancer who presented to the hospital abdominal pain and was found to have mesenteric hematoma with severe anemia.  Her creatinine was elevated.  She has known bladder mass.  She has chronic L hydronephrosis with large ureteral stent that appears patent.  R severe hydronephrosis with severe cortical thinning, likely non-functional.  Pt has been making urine and has post void residual of ~ 120ml (per nursing).  No alberts catheter.    Pt feels okay. Abd pain has improved    REVIEW OF SYSTEMS:  CONSTITUTIONAL: + weakness, no fevers or chills  EYES/ENT: No visual changes;  No vertigo or throat pain   NECK: No pain or stiffness  RESPIRATORY: No cough, wheezing, hemoptysis; No shortness of breath  CARDIOVASCULAR: No chest pain or palpitations.  GASTROINTESTINAL: no current abdominal pain. No nausea, vomiting, or hematemesis; No diarrhea or constipation. No melena or hematochezia.  GENITOURINARY: + hematuria, mild dysuria  NEUROLOGICAL: No numbness or weakness  VASCULAR: No bilateral lower extremity edema.       VITALS:  T(F): 98.9 (20 @ 12:18), Max: 98.9 (20 @ 12:18)  HR: 89 (20 @ 12:18)  BP: 138/84 (20 @ 12:18)  RR: 18 (20 @ 12:18)  SpO2: 98% (20 @ 12:18)  Wt(kg): --     @ 07:  -   @ 07:00  --------------------------------------------------------  IN: 850 mL / OUT: 0 mL / NET: 850 mL     @ 07:  -   @ 15:35  --------------------------------------------------------  IN: 720 mL / OUT: 0 mL / NET: 720 mL        PHYSICAL EXAM:  Constitutional: NAD, thin  HEENT: anicteric sclera, oropharynx clear, MMM  Neck: No JVD  Respiratory: CTAB, no wheezes, rales or rhonchi  Cardiovascular: S1, S2, RRR  Gastrointestinal: BS+, soft, NT/ND  Extremities: No cyanosis or clubbing. No peripheral edema  Neurological: A/O x 3, though a bit confused about medical history, no focal deficits  Psychiatric: Normal mood, normal affect  : No CVA tenderness. No alberts. +suprapubic tenderness (moderate)  Skin: + R chest wall port benign      LABS:      141  |  109<H>  |  55<H>  ----------------------------<  89  3.7   |  19<L>  |  3.74<H>    Ca    8.7      2020 05:59      Creatinine Trend: 3.74 <--, 5.00 <--, 6.36 <--, 6.55 <--, 6.75 <--                        11.3   13.12 )-----------( 122      ( 2020 12:36 )             33.9     Urine Studies:  Urinalysis Basic - ( 2020 17:16 )    Color: Light Yellow / Appearance: Slightly Turbid / S.012 / pH:   Gluc:  / Ketone: Negative  / Bili: Negative / Urobili: Negative   Blood:  / Protein: 30 mg/dL / Nitrite: Negative   Leuk Esterase: Moderate / RBC: 9 /hpf / WBC 27 /HPF   Sq Epi:  / Non Sq Epi: 1 /hpf / Bacteria: Negative      Creatinine, Random Urine: 48 mg/dL ( @ 02:05)  Sodium, Random Urine: 49 mmol/L ( @ 02:05

## 2020-11-26 NOTE — PROGRESS NOTE ADULT - SUBJECTIVE AND OBJECTIVE BOX
[facility] Team Surgery Daily Progress Note  =====================================================    SUBJECTIVE: Patient seen and examined at bedside on AM rounds. Patient reports that they're feeling well. Tolerating CLD, denies nausea, vomiting. BF +/+ OOB/Ambulating as tolerated. Denies fever, chills.     24 HOUR EVENTS:  advanced to CLD    MEDICATIONS  (STANDING):  amLODIPine   Tablet 5 milliGRAM(s) Oral daily  cefTRIAXone   IVPB 1000 milliGRAM(s) IV Intermittent every 24 hours  chlorhexidine 4% Liquid 1 Application(s) Topical daily  dextrose 5% + sodium chloride 0.9%. 1000 milliLiter(s) (75 mL/Hr) IV Continuous <Continuous>  DULoxetine 60 milliGRAM(s) Oral daily  metoprolol succinate ER 25 milliGRAM(s) Oral daily    MEDICATIONS  (PRN):  acetaminophen   Tablet .. 650 milliGRAM(s) Oral every 6 hours PRN Temp greater or equal to 38C (100.4F), Mild Pain (1 - 3)  morphine  - Injectable 2 milliGRAM(s) IV Push every 6 hours PRN Severe Pain (7 - 10)  oxyCODONE    IR 5 milliGRAM(s) Oral every 6 hours PRN Moderate Pain (4 - 6)      OBJECTIVE:    Vital Signs Last 24 Hrs  T(C): 36.8 (26 Nov 2020 00:34), Max: 36.9 (25 Nov 2020 08:03)  T(F): 98.2 (26 Nov 2020 00:34), Max: 98.4 (25 Nov 2020 08:03)  HR: 79 (26 Nov 2020 00:34) (79 - 87)  BP: 150/89 (26 Nov 2020 00:34) (146/82 - 171/90)  BP(mean): --  RR: 18 (26 Nov 2020 00:34) (18 - 18)  SpO2: 98% (26 Nov 2020 00:34) (96% - 99%)    Physical Examination:  GEN: NAD, resting quietly  PULM: symmetric chest rise bilaterally, no increased WOB  ABD: soft, nontender, nondistended, incision CDI  EXTR: no LE erythema, moving all extremities            I&O's Detail    24 Nov 2020 07:01  -  25 Nov 2020 07:00  --------------------------------------------------------  IN:    PRBCs (Packed Red Blood Cells): 300 mL  Total IN: 300 mL    OUT:    Oral Fluid: 0 mL    Voided (mL): 1050 mL  Total OUT: 1050 mL    Total NET: -750 mL      25 Nov 2020 07:01  -  26 Nov 2020 04:39  --------------------------------------------------------  IN:    Oral Fluid: 850 mL  Total IN: 850 mL    OUT:  Total OUT: 0 mL    Total NET: 850 mL          Daily     Daily     LABS:                        9.7    10.55 )-----------( 101      ( 25 Nov 2020 23:52 )             28.9     11-25    146<H>  |  113<H>  |  77<H>  ----------------------------<  92  4.1   |  18<L>  |  5.00<H>    Ca    9.1      25 Nov 2020 06:06  Phos  6.1     11-24  Mg     1.8     11-24    TPro  6.8  /  Alb  3.9  /  TBili  0.3  /  DBili  x   /  AST  20  /  ALT  22  /  AlkPhos  108  11-24

## 2020-11-26 NOTE — PROGRESS NOTE ADULT - SUBJECTIVE AND OBJECTIVE BOX
SUBJECTIVE / OVERNIGHT EVENTS: pt denies abd pain , + flatus, denies nausea, v    MEDICATIONS  (STANDING):  amLODIPine   Tablet 5 milliGRAM(s) Oral daily  chlorhexidine 4% Liquid 1 Application(s) Topical daily  dextrose 5% + sodium chloride 0.9%. 1000 milliLiter(s) (75 mL/Hr) IV Continuous <Continuous>  DULoxetine 60 milliGRAM(s) Oral daily  metoprolol succinate ER 25 milliGRAM(s) Oral daily    MEDICATIONS  (PRN):  acetaminophen   Tablet .. 650 milliGRAM(s) Oral every 6 hours PRN Temp greater or equal to 38C (100.4F), Mild Pain (1 - 3)  morphine  - Injectable 2 milliGRAM(s) IV Push every 6 hours PRN Severe Pain (7 - 10)  oxyCODONE    IR 5 milliGRAM(s) Oral every 6 hours PRN Moderate Pain (4 - 6)    Vital Signs Last 24 Hrs  T(C): 36.9 (2020 15:43), Max: 37.2 (2020 12:18)  T(F): 98.4 (2020 15:43), Max: 98.9 (2020 12:18)  HR: 84 (2020 15:43) (79 - 89)  BP: 144/84 (2020 15:43) (138/84 - 158/68)  BP(mean): --  RR: 18 (2020 15:43) (18 - 18)  SpO2: 98% (2020 15:43) (96% - 99%)    Constitutional: No fever, fatigue  Skin: No rash.  Eyes: No recent vision problems or eye pain.  ENT: No congestion, ear pain, or sore throat.  Cardiovascular: No chest pain or palpation.  Respiratory: No cough, shortness of breath, congestion, or wheezing.  Gastrointestinal: No abdominal pain, nausea, vomiting, or diarrhea.  Genitourinary: No dysuria.  Musculoskeletal: No joint swelling.  Neurologic: No headache.    PHYSICAL EXAM:  GENERAL: NAD  EYES: EOMI, PERRLA  NECK: Supple, No JVD  CHEST/LUNG: dec breath sounds at bases   HEART:  S1 , S2 +  ABDOMEN: soft , bs+, mild distension , no tenderness, no guarding   EXTREMITIES:  no edema  NEUROLOGY:alert awake    LABS:      141  |  109<H>  |  55<H>  ----------------------------<  89  3.7   |  19<L>  |  3.74<H>    Ca    8.7      2020 05:59      Creatinine Trend: 3.74 <--, 5.00 <--, 6.36 <--, 6.55 <--, 6.75 <--                        10.4   12.10 )-----------( 115      ( 2020 18:59 )             31.4     Urine Studies:  Urinalysis Basic - ( 2020 17:16 )    Color: Light Yellow / Appearance: Slightly Turbid / S.012 / pH:   Gluc:  / Ketone: Negative  / Bili: Negative / Urobili: Negative   Blood:  / Protein: 30 mg/dL / Nitrite: Negative   Leuk Esterase: Moderate / RBC: 9 /hpf / WBC 27 /HPF   Sq Epi:  / Non Sq Epi: 1 /hpf / Bacteria: Negative      Creatinine, Random Urine: 48 mg/dL ( @ 02:05)  Sodium, Random Urine: 49 mmol/L ( @ 02:05)

## 2020-11-26 NOTE — PROGRESS NOTE ADULT - SUBJECTIVE AND OBJECTIVE BOX
MARISOL [id number]  70yFemale  T(C): 36.7 (11-26-20 @ 08:43), Max: 36.9 (11-25-20 @ 12:02)  HR: 88 (11-26-20 @ 08:43) (79 - 88)  BP: 144/87 (11-26-20 @ 08:43) (144/87 - 162/91)  RR: 18 (11-26-20 @ 08:43) (18 - 18)  SpO2: 99% (11-26-20 @ 08:43) (96% - 99%)  Wt(kg): --  11-25 @ 07:01  -  11-26 @ 07:00  --------------------------------------------------------  IN: 850 mL / OUT: 0 mL / NET: 850 mL      normal cephalic atraumatic  s1s2   clear to ascultation bilaterally  soft, non tender, non distended no guarding or rebound  no clubbing cyanosis or edema

## 2020-11-26 NOTE — PROGRESS NOTE ADULT - ASSESSMENT
70F with PMH of HTN, neuropathic pain, stage 4 colon cancer (initially dx 2004 s/p hemicolectomy, with recurrence in past years with mets to the bladder c/b hydronephrosis s/p R ureteral stent last exchanged 9/2020) last chemo with folfox on 10/19/20  p/w abdominal pain. Patient starting having lower abdominal pain yesterday and was told to come in to the ER for eval. Ct abd shows Status post right hemicolectomy with ileocolic anastomosis. High attenuating mesenteric hematoma with hematocrit level seen within the bowel mesentery measuring 5.2 x 3.2 cm. There is associated infiltration and edema within the bowel mesentery. No bowel obstruction.    1, Abdominal pain  - pt found to have mesenteric hematoma   - surgery follow up recommended observation .  - hg on presentation was 6.4 s/p PRBC on admission   - had one prbc 2 days ago  due to hg drop  - follow up with surgery reccs   - may need embolization if hg continues to drop   - hg stable for now     2. Anemia   - macrocytic anemia   - would recommend b12 folate LDH haptoglobin spep   - monitor cbc closely     3. Colon cancer   - pt to follow up with [facility] after discharge     4. Elevated Cr  - pt with b/l hydro with stents   - pt known abnormal cr   - has urologist on the outside.     5. Bladder mass   - Pt had this in jan   - was biopsied and consistent with adeno   - will speak to [facility]     6. catheter assoc DVT   - reported catheter associated DVT on 4/17/20  - was on lovenox 50 mg daily   - will no longer need AC   - discussed wtih [facility] hematolgist     Maldonado Chacon MD  Hematology Oncology   [phone]

## 2020-11-27 ENCOUNTER — TRANSCRIPTION ENCOUNTER (OUTPATIENT)
Age: 70
End: 2020-11-27

## 2020-11-27 LAB
ANION GAP SERPL CALC-SCNC: 18 MMOL/L — HIGH (ref 5–17)
BASOPHILS # BLD AUTO: 0.04 K/UL — SIGNIFICANT CHANGE UP (ref 0–0.2)
BASOPHILS NFR BLD AUTO: 0.2 % — SIGNIFICANT CHANGE UP (ref 0–2)
BUN SERPL-MCNC: 45 MG/DL — HIGH (ref 7–23)
CALCIUM SERPL-MCNC: 8.8 MG/DL — SIGNIFICANT CHANGE UP (ref 8.4–10.5)
CHLORIDE SERPL-SCNC: 107 MMOL/L — SIGNIFICANT CHANGE UP (ref 96–108)
CO2 SERPL-SCNC: 16 MMOL/L — LOW (ref 22–31)
CREAT SERPL-MCNC: 3.36 MG/DL — HIGH (ref 0.5–1.3)
EOSINOPHIL # BLD AUTO: 0.08 K/UL — SIGNIFICANT CHANGE UP (ref 0–0.5)
EOSINOPHIL NFR BLD AUTO: 0.5 % — SIGNIFICANT CHANGE UP (ref 0–6)
GLUCOSE SERPL-MCNC: 160 MG/DL — HIGH (ref 70–99)
HCT VFR BLD CALC: 33.3 % — LOW (ref 34.5–45)
HCT VFR BLD CALC: 37.5 % — SIGNIFICANT CHANGE UP (ref 34.5–45)
HCT VFR BLD CALC: 40.5 % — SIGNIFICANT CHANGE UP (ref 34.5–45)
HGB BLD-MCNC: 11.3 G/DL — LOW (ref 11.5–15.5)
HGB BLD-MCNC: 12.3 G/DL — SIGNIFICANT CHANGE UP (ref 11.5–15.5)
HGB BLD-MCNC: 13.2 G/DL — SIGNIFICANT CHANGE UP (ref 11.5–15.5)
IMM GRANULOCYTES NFR BLD AUTO: 0.5 % — SIGNIFICANT CHANGE UP (ref 0–1.5)
LYMPHOCYTES # BLD AUTO: 14 % — SIGNIFICANT CHANGE UP (ref 13–44)
LYMPHOCYTES # BLD AUTO: 2.41 K/UL — SIGNIFICANT CHANGE UP (ref 1–3.3)
MCHC RBC-ENTMCNC: 31.9 PG — SIGNIFICANT CHANGE UP (ref 27–34)
MCHC RBC-ENTMCNC: 32.3 PG — SIGNIFICANT CHANGE UP (ref 27–34)
MCHC RBC-ENTMCNC: 32.4 PG — SIGNIFICANT CHANGE UP (ref 27–34)
MCHC RBC-ENTMCNC: 32.6 GM/DL — SIGNIFICANT CHANGE UP (ref 32–36)
MCHC RBC-ENTMCNC: 32.8 GM/DL — SIGNIFICANT CHANGE UP (ref 32–36)
MCHC RBC-ENTMCNC: 33.9 GM/DL — SIGNIFICANT CHANGE UP (ref 32–36)
MCV RBC AUTO: 95.4 FL — SIGNIFICANT CHANGE UP (ref 80–100)
MCV RBC AUTO: 97.4 FL — SIGNIFICANT CHANGE UP (ref 80–100)
MCV RBC AUTO: 99 FL — SIGNIFICANT CHANGE UP (ref 80–100)
MONOCYTES # BLD AUTO: 1.08 K/UL — HIGH (ref 0–0.9)
MONOCYTES NFR BLD AUTO: 6.3 % — SIGNIFICANT CHANGE UP (ref 2–14)
NEUTROPHILS # BLD AUTO: 13.55 K/UL — HIGH (ref 1.8–7.4)
NEUTROPHILS NFR BLD AUTO: 78.5 % — HIGH (ref 43–77)
NRBC # BLD: 0 /100 WBCS — SIGNIFICANT CHANGE UP (ref 0–0)
PLATELET # BLD AUTO: 122 K/UL — LOW (ref 150–400)
PLATELET # BLD AUTO: 148 K/UL — LOW (ref 150–400)
PLATELET # BLD AUTO: 159 K/UL — SIGNIFICANT CHANGE UP (ref 150–400)
POTASSIUM SERPL-MCNC: 3.4 MMOL/L — LOW (ref 3.5–5.3)
POTASSIUM SERPL-SCNC: 3.4 MMOL/L — LOW (ref 3.5–5.3)
RAPID RVP RESULT: SIGNIFICANT CHANGE UP
RBC # BLD: 3.49 M/UL — LOW (ref 3.8–5.2)
RBC # BLD: 3.85 M/UL — SIGNIFICANT CHANGE UP (ref 3.8–5.2)
RBC # BLD: 4.09 M/UL — SIGNIFICANT CHANGE UP (ref 3.8–5.2)
RBC # FLD: 17.7 % — HIGH (ref 10.3–14.5)
RBC # FLD: 17.9 % — HIGH (ref 10.3–14.5)
RBC # FLD: 17.9 % — HIGH (ref 10.3–14.5)
SARS-COV-2 RNA SPEC QL NAA+PROBE: SIGNIFICANT CHANGE UP
SODIUM SERPL-SCNC: 141 MMOL/L — SIGNIFICANT CHANGE UP (ref 135–145)
WBC # BLD: 14.2 K/UL — HIGH (ref 3.8–10.5)
WBC # BLD: 17.25 K/UL — HIGH (ref 3.8–10.5)
WBC # BLD: 20.36 K/UL — HIGH (ref 3.8–10.5)
WBC # FLD AUTO: 14.2 K/UL — HIGH (ref 3.8–10.5)
WBC # FLD AUTO: 17.25 K/UL — HIGH (ref 3.8–10.5)
WBC # FLD AUTO: 20.36 K/UL — HIGH (ref 3.8–10.5)

## 2020-11-27 PROCEDURE — 71045 X-RAY EXAM CHEST 1 VIEW: CPT | Mod: 26

## 2020-11-27 PROCEDURE — 99223 1ST HOSP IP/OBS HIGH 75: CPT | Mod: GC

## 2020-11-27 PROCEDURE — 99233 SBSQ HOSP IP/OBS HIGH 50: CPT

## 2020-11-27 RX ORDER — POTASSIUM CHLORIDE 20 MEQ
20 PACKET (EA) ORAL ONCE
Refills: 0 | Status: COMPLETED | OUTPATIENT
Start: 2020-11-27 | End: 2020-11-27

## 2020-11-27 RX ORDER — ONDANSETRON 8 MG/1
4 TABLET, FILM COATED ORAL ONCE
Refills: 0 | Status: COMPLETED | OUTPATIENT
Start: 2020-11-27 | End: 2020-11-27

## 2020-11-27 RX ADMIN — ONDANSETRON 4 MILLIGRAM(S): 8 TABLET, FILM COATED ORAL at 06:31

## 2020-11-27 RX ADMIN — Medication 650 MILLIGRAM(S): at 23:30

## 2020-11-27 RX ADMIN — AMLODIPINE BESYLATE 5 MILLIGRAM(S): 2.5 TABLET ORAL at 06:31

## 2020-11-27 RX ADMIN — CHLORHEXIDINE GLUCONATE 1 APPLICATION(S): 213 SOLUTION TOPICAL at 11:54

## 2020-11-27 RX ADMIN — DULOXETINE HYDROCHLORIDE 60 MILLIGRAM(S): 30 CAPSULE, DELAYED RELEASE ORAL at 11:53

## 2020-11-27 RX ADMIN — Medication 20 MILLIEQUIVALENT(S): at 11:53

## 2020-11-27 RX ADMIN — Medication 650 MILLIGRAM(S): at 22:26

## 2020-11-27 RX ADMIN — Medication 25 MILLIGRAM(S): at 06:31

## 2020-11-27 NOTE — PROGRESS NOTE ADULT - SUBJECTIVE AND OBJECTIVE BOX
Patient seen and examined at bedside. pt feels well.  no pain or urinary complaints  no chills. afebrile    MEDICATIONS  (STANDING):  amLODIPine   Tablet 5 milliGRAM(s) Oral daily  chlorhexidine 4% Liquid 1 Application(s) Topical daily  dextrose 5% + sodium chloride 0.9%. 1000 milliLiter(s) (75 mL/Hr) IV Continuous <Continuous>  DULoxetine 60 milliGRAM(s) Oral daily  metoprolol succinate ER 25 milliGRAM(s) Oral daily    MEDICATIONS  (PRN):  acetaminophen   Tablet .. 650 milliGRAM(s) Oral every 6 hours PRN Temp greater or equal to 38C (100.4F), Mild Pain (1 - 3)  morphine  - Injectable 2 milliGRAM(s) IV Push every 6 hours PRN Severe Pain (7 - 10)  oxyCODONE    IR 5 milliGRAM(s) Oral every 6 hours PRN Moderate Pain (4 - 6)    Vital Signs Last 24 Hrs  T(C): 36.7 (27 Nov 2020 05:03), Max: 36.9 (26 Nov 2020 15:43)  T(F): 98.1 (27 Nov 2020 05:03), Max: 98.4 (26 Nov 2020 15:43)  HR: 92 (27 Nov 2020 05:03) (76 - 92)  BP: 139/91 (27 Nov 2020 05:03) (139/91 - 161/76)  BP(mean): --  RR: 18 (27 Nov 2020 05:03) (18 - 18)  SpO2: 96% (27 Nov 2020 05:03) (96% - 98%)      PHYSICAL EXAM:     GENERAL:  Appears stated age, well-groomed  HEENT:  NC/AT,  CHEST:  CTA b/l  HEART:  S1 s2+   ABDOMEN:  Soft, non-tender, non-distended  EXTEREMITIES:  no cyanosis,clubbing or edema  SKIN:  No rash/erythema/ecchymoses                          13.2   20.36 )-----------( 159      ( 27 Nov 2020 06:36 )             40.5                             10.6   10.78 )-----------( 109      ( 26 Nov 2020 05:59 )             30.9       11-26    141  |  109<H>  |  55<H>  ----------------------------<  89  3.7   |  19<L>  |  3.74<H>    Ca    8.7      26 Nov 2020 05:59

## 2020-11-27 NOTE — CONSULT NOTE ADULT - ASSESSMENT
ASSESSMENT:    RECOMMENDATIONS:    JI Latham, MD  Fellow, Infectious Diseases  Pager: [phone]  If no response, after 5pm and on Weekends: Call [phone] 70/F with PMH HTN, neuropathic pain, stage 4 colon cancer (dx 2004 s/p hemicolectomy), with bladder recurrence c/b hydronephrosis s/p Rt ureteral stent (last exchanged 9/2020) currently on chemo (last 4-5 weeks PTA at [facility]) through left chest port (placed 2019), LE DVT on LMWH.  Admitted 11/23  with abdominal pain. Found to have anemia, LEONEL on CKD. Imaging showed mesenteric hematoma and hemoperitoneum and b/l hydronephrosis.  Eval by Surgery and urology - no acute interventions planned.  Received pRBCs 2 units (11/23, 11/24) and Ceftriaxone 11/23-11/26 for positive UA. ID consulted for leucocytosis.  =========  Leucocytosis in pt with h/o Colon Ca on chemo  - unclear cause of leucocytosis - likely stress response to acute bleeding  - afebrile, hemodynamically stable with no acute localizing symptoms. LEONEL resolving, Hb improved  - UA 11/23 showed WBC 27, RBC 9, moderate LE. Urine cx negative. No urinary symptoms. S/p Ceftriaxone x 4 days  - would monitor off antibiotics for now and f/u culture results    RECOMMENDATIONS:  - monitor clinically off antibiotics for now  - f/u blood cx (peripheral and port)  - trend daily CBC  Recs conveyed to primary team NP    JI Latham, MD  Fellow, Infectious Diseases  Pager: [phone]  If no response, after 5pm and on Weekends: Call [phone]

## 2020-11-27 NOTE — DISCHARGE NOTE PROVIDER - PROVIDER TOKENS
PROVIDER:[TOKEN:[[id number]]],FREE:[LAST:[sasko],FIRST:[nena],PHONE:[(   )    -],FAX:[(   )    -],ADDRESS:[PMD]]

## 2020-11-27 NOTE — PROGRESS NOTE ADULT - SUBJECTIVE AND OBJECTIVE BOX
SUBJECTIVE / OVERNIGHT EVENTS: pt denies abd pain , + flatus, denies nausea, v    MEDICATIONS  (STANDING):  amLODIPine   Tablet 5 milliGRAM(s) Oral daily  chlorhexidine 4% Liquid 1 Application(s) Topical daily  dextrose 5% + sodium chloride 0.9%. 1000 milliLiter(s) (75 mL/Hr) IV Continuous <Continuous>  DULoxetine 60 milliGRAM(s) Oral daily  metoprolol succinate ER 25 milliGRAM(s) Oral daily    MEDICATIONS  (PRN):  acetaminophen   Tablet .. 650 milliGRAM(s) Oral every 6 hours PRN Temp greater or equal to 38C (100.4F), Mild Pain (1 - 3)  morphine  - Injectable 2 milliGRAM(s) IV Push every 6 hours PRN Severe Pain (7 - 10)  oxyCODONE    IR 5 milliGRAM(s) Oral every 6 hours PRN Moderate Pain (4 - 6)    Vital Signs Last 24 Hrs  T(C): 36.9 (2020 20:27), Max: 37 (2020 12:34)  T(F): 98.4 (2020 20:27), Max: 98.6 (2020 12:34)  HR: 90 (2020 20:27) (76 - 92)  BP: 163/90 (2020 20:27) (139/91 - 163/90)  BP(mean): --  RR: 18 (2020 20:27) (18 - 18)  SpO2: 98% (2020 20:27) (96% - 98%)    Constitutional: No fever, fatigue  Skin: No rash.  Eyes: No recent vision problems or eye pain.  ENT: No congestion, ear pain, or sore throat.  Cardiovascular: No chest pain or palpation.  Respiratory: No cough, shortness of breath, congestion, or wheezing.  Gastrointestinal: No abdominal pain, nausea, vomiting, or diarrhea.  Genitourinary: No dysuria.  Musculoskeletal: No joint swelling.  Neurologic: No headache.    PHYSICAL EXAM:  GENERAL: NAD  EYES: EOMI, PERRLA  NECK: Supple, No JVD  CHEST/LUNG: dec breath sounds at bases   HEART:  S1 , S2 +  ABDOMEN: soft , bs+, mild distension , no tenderness, no guarding   EXTREMITIES:  no edema  NEUROLOGY:alert awake    LABS:      141  |  107  |  45<H>  ----------------------------<  160<H>  3.4<L>   |  16<L>  |  3.36<H>    Ca    8.8      2020 06:36      Creatinine Trend: 3.36 <--, 3.74 <--, 5.00 <--, 6.36 <--, 6.55 <--, 6.75 <--                        12.3   17.25 )-----------( 148      ( 2020 14:32 )             37.5     Urine Studies:  Urinalysis Basic - ( 2020 17:16 )    Color: Light Yellow / Appearance: Slightly Turbid / S.012 / pH:   Gluc:  / Ketone: Negative  / Bili: Negative / Urobili: Negative   Blood:  / Protein: 30 mg/dL / Nitrite: Negative   Leuk Esterase: Moderate / RBC: 9 /hpf / WBC 27 /HPF   Sq Epi:  / Non Sq Epi: 1 /hpf / Bacteria: Negative      Creatinine, Random Urine: 48 mg/dL ( @ 02:05)  Sodium, Random Urine: 49 mmol/L ( @ 02:05)

## 2020-11-27 NOTE — CONSULT NOTE ADULT - ATTENDING COMMENTS
[facility] NEPHROLOGY  Negro Salinas M.D.  Arturo Bender D.O.  Jeaneth Helms M.D.  Kinga Aranda, MSN, ANP-C    Telephone: [phone]  Facsimile: [phone]    [address] [place] [address]
I have seen and examined the patient.  I agree with the surgical resident's note.  will follow with serial abd exams and blood work    Georges Ch contact information [phone]
70 F with PMH HTN, neuropathic pain, stage 4 colon cancer (dx 2004 s/p hemicolectomy), with bladder recurrence c/b hydronephrosis s/p Rt ureteral stent (last exchanged 9/2020) currently on chemo (last 4-5 weeks PTA at [facility]) through left chest port (placed 2019), LE DVT on LMWH.  Leukocytosis, no fever  CT with mesenteric hematoma and hemoperitoneum  Patient generally well appearing without focal signs infection; no symptoms PNA, UTI, minimal pain  Note presence of mediport  With no overt signs infection, suspect leukocytosis reactive to hematoma  Overall,  1) Leukocytosis  - Monitor closely for signs infection; presently suspect reactive picture 2/2 hematoma rather than true infection  - Low threshold to start broad spectrum abx if signs worsening (patient currently very well appearing at bedside)  - F/U pending BCXs, RVP  - Monitor port site  2) Positive UA  - Mild positive UA with RBCs, no nitrites  - Low suspicion, no symptoms UTI presently  - F/U pending UCX  - Would not treat unless active signs UTI  3) Hemoperitoneum  - Further care per surgery teams    Holden Valentino MD  Pager [phone]  After 5pm and on weekends call [phone]    I was physically present for the key portions of the evaluation and management service provided. I saw and examined the patient. I agree with the above history, physical, and plan except for any discrepancies which I have documented in “Attending Attestation.” Please refer to “Attending Attestation” for final plan.

## 2020-11-27 NOTE — DISCHARGE NOTE PROVIDER - CARE PROVIDER_API CALL
GOLDBERG, ZOE  [id number]  [address] [place] [place] [address]  Phone: ()-  Fax: ()-  Follow Up Time:     nena quinn  Phone: (   )    -  Fax: (   )    -  Follow Up Time:

## 2020-11-27 NOTE — PROGRESS NOTE ADULT - SUBJECTIVE AND OBJECTIVE BOX
MARISOL [id number]  70yFemale  T(C): 36.7 (11-27-20 @ 05:03), Max: 37.2 (11-26-20 @ 12:18)  HR: 92 (11-27-20 @ 05:03) (76 - 92)  BP: 139/91 (11-27-20 @ 05:03) (138/84 - 161/76)  RR: 18 (11-27-20 @ 05:03) (18 - 18)  SpO2: 96% (11-27-20 @ 05:03) (96% - 99%)  Wt(kg): --  11-26 @ 07:01  -  11-27 @ 07:00  --------------------------------------------------------  IN: 720 mL / OUT: 0 mL / NET: 720 mL      normal cephalic atraumatic  s1s2   clear to ascultation bilaterally  soft, non tender, non distended no guarding or rebound  no clubbing cyanosis or edema

## 2020-11-27 NOTE — DISCHARGE NOTE PROVIDER - NSDCCPCAREPLAN_GEN_ALL_CORE_FT
PRINCIPAL DISCHARGE DIAGNOSIS  Diagnosis: Hemoperitoneum  Assessment and Plan of Treatment:       SECONDARY DISCHARGE DIAGNOSES  Diagnosis: Abdominal pain  Assessment and Plan of Treatment:     Diagnosis: Colon cancer  Assessment and Plan of Treatment:     Diagnosis: Hydronephrosis  Assessment and Plan of Treatment:      PRINCIPAL DISCHARGE DIAGNOSIS  Diagnosis: Mesenteric hematoma  Assessment and Plan of Treatment: Hemoglobin stable. Follow up with PMD next week      SECONDARY DISCHARGE DIAGNOSES  Diagnosis: LEONEL (acute kidney injury)  Assessment and Plan of Treatment: Avoid taking (NSAIDs) - (ex: Ibuprofen, Advil, Celebrex, Naprosyn)  Avoid taking any nephrotoxic agents (can harm kidneys) - Intravenous contrast for diagnostic testing, combination cold medications.  Have all medications adjusted for your renal function by your Health Care Provider.  Blood pressure control is important.  Take all medication as prescribed.      Diagnosis: Acute UTI  Assessment and Plan of Treatment: HOME CARE INSTRUCTIONS  Antibiotics finished 11/24/20  Drink enough water and fluids to keep your urine clear or pale yellow.  Avoid caffeine, tea, and carbonated beverages. They tend to irritate your bladder.  Empty your bladder often. Avoid holding urine for long periods of time.  Empty your bladder before and after sexual intercourse.  After a bowel movement, women should cleanse from front to back. Use each tissue only once.  SEEK MEDICAL CARE IF:  You have back pain.  You develop a fever.  Your symptoms do not begin to resolve within 3 days.  SEEK IMMEDIATE MEDICAL CARE IF:  You have severe back pain or lower abdominal pain.  You develop chills.  You have nausea or vomiting.  You have continued burning or discomfort with urination.      Diagnosis: History of DVT in adulthood  Assessment and Plan of Treatment: Recent Lower extremity doppler 11/25/20 without DVT. Remain off lovenox .Follow up with PMD and hematology    Diagnosis: Colon cancer  Assessment and Plan of Treatment: Follow up with Dr. Garcia for managment    Diagnosis: Hydronephrosis  Assessment and Plan of Treatment: Follow up [facility] for managment of uretal stent     PRINCIPAL DISCHARGE DIAGNOSIS  Diagnosis: Mesenteric hematoma  Assessment and Plan of Treatment: Hemoglobin stable. Follow up with PMD next week. Do NOT adminstered any Lovenox. You are off this medication      SECONDARY DISCHARGE DIAGNOSES  Diagnosis: LEONEL (acute kidney injury)  Assessment and Plan of Treatment: Avoid taking (NSAIDs) - (ex: Ibuprofen, Advil, Celebrex, Naprosyn)  Avoid taking any nephrotoxic agents (can harm kidneys) - Intravenous contrast for diagnostic testing, combination cold medications.  Have all medications adjusted for your renal function by your Health Care Provider.  Blood pressure control is important.  Take all medication as prescribed.  Please follow up with the Nephrologist to monitor yor CO2 levels       Diagnosis: Essential hypertension  Assessment and Plan of Treatment: Low salt diet  Activity as tolerated.  Take all medication as prescribed.  Follow up with your medical doctor for routine blood pressure monitoring at your next visit.  Notify your doctor if you have any of the following symptoms:   Dizziness, Lightheadedness, Blurry vision, Headache, Chest pain, Shortness of breath      Diagnosis: Bilateral hydronephrosis  Assessment and Plan of Treatment: Please follow up with your Urologist for exchanges as previous    Diagnosis: Acute UTI  Assessment and Plan of Treatment: HOME CARE INSTRUCTIONS  Antibiotics finished 11/24/20  Drink enough water and fluids to keep your urine clear or pale yellow.  Avoid caffeine, tea, and carbonated beverages. They tend to irritate your bladder.  Empty your bladder often. Avoid holding urine for long periods of time.  Empty your bladder before and after sexual intercourse.  After a bowel movement, women should cleanse from front to back. Use each tissue only once.  SEEK MEDICAL CARE IF:  You have back pain.  You develop a fever.  Your symptoms do not begin to resolve within 3 days.  SEEK IMMEDIATE MEDICAL CARE IF:  You have severe back pain or lower abdominal pain.  You develop chills.  You have nausea or vomiting.  You have continued burning or discomfort with urination.      Diagnosis: History of DVT in adulthood  Assessment and Plan of Treatment: Recent Lower extremity doppler 11/25/20 without DVT. Remain off lovenox .Follow up with PMD and hematology    Diagnosis: Colon cancer  Assessment and Plan of Treatment: Follow up with Dr. Garcia for managment    Diagnosis: Hydronephrosis  Assessment and Plan of Treatment: Follow up [facility] for managment of uretal stent

## 2020-11-27 NOTE — CONSULT NOTE ADULT - SUBJECTIVE AND OBJECTIVE BOX
Patient is a 70y old  Female who presents with a chief complaint of abdominal pain (2020 12:25)    HPI:  70F with PMH of HTN, neuropathic pain, stage 4 colon cancer (initially dx 2004 s/p hemicolectomy, with recurrence in past years with mets to the bladder c/b hydronephrosis s/p R ureteral stent last exchanged 2020) currently on chemo, DVT (?neck) on lovenox p/w abdominal pain. States she started having abdominal the night prior to admission, located in b/l lower quadrants, intermittent, crampy, 8/10 at its worst, non radiating, mildly improved after urination, had small BM this morning, but minimal flatus since then; has chronic nausea from cancer/chemo, not currently worse than usual, no vomiting, no blood in stool, no dysuria, hematuria, no change in urinary frequency or volume. No dizziness, lightheadedness, no CP, SOB/ELLISON, palpitations. Endorses chronic mild LE edema, currently at baseline. Rest of ROS negative    Pt follows with Dr Goldberg for chemo for stage 4 colon cancer - last chemo 4-5 weeks ago, at which time decision made to change to oral chemo, however has not received new medications yet. Had R ureteral stent placed a couple of years ago and follows with Dr Sawyer q3-5months for exchanges. Pt has required rare intermittent blood transfusions in the past. In not aware if she has kidney disease.      (2020 23:29)  =======  - admitted  - anemia, LEONEL on CKD with imaging showing mesenteric hematoma and hemoperitoneum and b/l hydronephrosis. Received 1 unit pRBC  -  eval by Surgery in ER - no operative intervention indicated at this time  -  eval by Urology - no acute  intervention  -  Hem/Onc consulted - pt to follow up with [facility] after discharge  -  Nephrology and GI consulted  -  Hb dropped 8.1  to  7.3. 1 unit pRBC ordered  - UA positive, started on Ceftriaxone by primary team. Received Ceftriaxone -  - ID consulted for leucocytosis    Received Ceftriaxone -  Received pRBCs ,      prior hospital charts reviewed [  ]  primary team notes reviewed [  ]  other consultant notes reviewed [  ]    PAST MEDICAL & SURGICAL HISTORY:  Hydronephrosis    DVT, lower extremity    Colon cancer    HTN (hypertension)    Lumbar herniated disc    Osteoporosis    H/O hemicolectomy    H/O colonoscopy     delivery delivered  x 2 -  and       Allergies  No Known Allergies    ANTIMICROBIALS (past 90 days)  MEDICATIONS  (STANDING):  cefTRIAXone   IVPB   100 mL/Hr IV Intermittent (20 @ 18:26)    cefTRIAXone   IVPB   100 mL/Hr IV Intermittent (20 @ 18:06)   100 mL/Hr IV Intermittent (20 @ 17:37)   100 mL/Hr IV Intermittent (20 @ 17:31)      ANTIMICROBIALS:      OTHER MEDS: MEDICATIONS  (STANDING):  acetaminophen   Tablet .. 650 every 6 hours PRN  amLODIPine   Tablet 5 daily  DULoxetine 60 daily  metoprolol succinate ER 25 daily  morphine  - Injectable 2 every 6 hours PRN  oxyCODONE    IR 5 every 6 hours PRN    SOCIAL HISTORY:   hx smoking  non-smoker    FAMILY HISTORY:  No pertinent family history in first degree relatives      REVIEW OF SYSTEMS  [  ] ROS unobtainable because:    [  ] All other systems negative except as noted below:	    Constitutional:  [ ] fever [ ] chills  [ ] weight loss  [ ] weakness  Skin:  [ ] rash [ ] phlebitis	  Eyes: [ ] icterus [ ] pain  [ ] discharge	  ENMT: [ ] sore throat  [ ] thrush [ ] ulcers [ ] exudates  Respiratory: [ ] dyspnea [ ] hemoptysis [ ] cough [ ] sputum	  Cardiovascular:  [ ] chest pain [ ] palpitations [ ] edema	  Gastrointestinal:  [ ] nausea [ ] vomiting [ ] diarrhea [ ] constipation [ ] pain	  Genitourinary:  [ ] dysuria [ ] frequency [ ] hematuria [ ] discharge [ ] flank pain  [ ] incontinence  Musculoskeletal:  [ ] myalgias [ ] arthralgias [ ] arthritis  [ ] back pain  Neurological:  [ ] headache [ ] seizures  [ ] confusion/altered mental status  Psychiatric:  [ ] anxiety [ ] depression	  Hematology/Lymphatics:  [ ] lymphadenopathy  Endocrine:  [ ] adrenal [ ] thyroid  Allergic/Immunologic:	 [ ] transplant [ ] seasonal    Vital Signs Last 24 Hrs  T(F): 98.6 (20 @ 12:34), Max: 99 (20 @ 22:12)  Vital Signs Last 24 Hrs  HR: 80 (20 @ 12:34) (76 - 92)  BP: 140/84 (20 @ 12:34) (139/91 - 161/76)  RR: 18 (20 @ 12:34)  SpO2: 98% (20 @ 12:34) (96% - 98%)  Wt(kg): --    EXAM:  Constitutional: Not in acute distress  Eyes: pupils bilaterally reactive to light. No icterus.  Oral cavity: Clear, no lesions  Neck: No neck vein distension noted  RS: Chest clear to auscultation bilaterally. No wheeze/rhonchi/crepitations.  CVS: S1, S2 heard. Regular rate and rhythm. No murmurs/rubs/gallops.  Abdomen: Soft. No guarding/rigidity/tenderness.  : No acute abnormalities  Extremities: Warm. No pedal edema  Skin: No lesions noted  Vascular: No evidence of phlebitis  Neuro: Alert, oriented to time/place/person                          12.3   17.25 )-----------( 148      ( 2020 14:32 )             37.5         141  |  107  |  45<H>  ----------------------------<  160<H>  3.4<L>   |  16<L>  |  3.36<H>    Ca    8.8      2020 06:36      MICROBIOLOGY:  Culture - Urine (collected 2020 22:07)  Source: .Urine Clean Catch (Midstream)  Final Report (2020 21:51):    Normal Urogenital shae present      RADIOLOGY:  imaging below personally reviewed  < from: [facility] Duplex Lower Ext Vein Scan, Bilat (20 @ 14:47) >  IMPRESSION:  No evidence of deep venous thrombosis in either lower extremity.  < end of copied text >    < from: US Kidney and Bladder (20 @ 10:01) >  IMPRESSION:  Severe right hydronephrosis.  Left ureteral stent with moderate left hydronephrosis.  Heterogeneous posterior bladder wall mass compatible with known bladder neoplasm.  Right pleural effusion.  < end of copied text >    < from: Xray Chest 1 View- PORTABLE-Urgent (Xray Chest 1 View- PORTABLE-Urgent .) (20 @ 01:53) >  Impression:  Unchanged small right pleural effusion  < end of copied text >    < from: CT Abdomen and Pelvis No Cont (.20 @ 17:36) >  IMPRESSION:  Large mesenteric hematoma. Additional hemoperitoneum.  Bladder mass and severe bilateral hydronephrosis.  < end of copied text >      OTHER TESTS:     Patient is a 70y old  Female who presents with a chief complaint of abdominal pain (2020 12:25)    HPI:  70F with PMH of HTN, neuropathic pain, stage 4 colon cancer (initially dx 2004 s/p hemicolectomy, with recurrence in past years with mets to the bladder c/b hydronephrosis s/p R ureteral stent last exchanged 2020) currently on chemo, DVT (?neck) on lovenox p/w abdominal pain. States she started having abdominal the night prior to admission, located in b/l lower quadrants, intermittent, crampy, 8/10 at its worst, non radiating, mildly improved after urination, had small BM this morning, but minimal flatus since then; has chronic nausea from cancer/chemo, not currently worse than usual, no vomiting, no blood in stool, no dysuria, hematuria, no change in urinary frequency or volume. No dizziness, lightheadedness, no CP, SOB/ELLISON, palpitations. Endorses chronic mild LE edema, currently at baseline. Rest of ROS negative    Pt follows with Dr Goldberg for chemo for stage 4 colon cancer - last chemo 4-5 weeks ago, at which time decision made to change to oral chemo, however has not received new medications yet. Had R ureteral stent placed a couple of years ago and follows with Dr Sawyer q3-5months for exchanges. Pt has required rare intermittent blood transfusions in the past. In not aware if she has kidney disease.      (2020 23:29)  =======  - admitted  - anemia, LEONEL on CKD with imaging showing mesenteric hematoma and hemoperitoneum and b/l hydronephrosis. Received 1 unit pRBC  -  eval by Surgery in ER - no operative intervention indicated at this time  -  eval by Urology - no acute  intervention  -  Hem/Onc consulted - pt to follow up with [facility] after discharge  -  Nephrology and GI consulted  -  Hb dropped 8.1  to  7.3. 1 unit pRBC ordered  -  UA positive, started on Ceftriaxone by primary team. Received Ceftriaxone -  - ID consulted for leucocytosis  ----------  - Above HPI reviewed and reconciled with patient  - Denied fevers, chills, cough, coryza, dysuria, loose stools, recent travel, sick contacts    Received Ceftriaxone -  Received pRBCs ,      prior hospital charts reviewed [x]  primary team notes reviewed [x]  other consultant notes reviewed [x]    PAST MEDICAL & SURGICAL HISTORY:  Hydronephrosis    DVT, lower extremity    Colon cancer    HTN (hypertension)    Lumbar herniated disc    Osteoporosis    H/O hemicolectomy    H/O colonoscopy     delivery delivered  x 2 -  and       Allergies  No Known Allergies    ANTIMICROBIALS (past 90 days)  MEDICATIONS  (STANDING):  cefTRIAXone   IVPB   100 mL/Hr IV Intermittent (20 @ 18:26)    cefTRIAXone   IVPB   100 mL/Hr IV Intermittent (20 @ 18:06)   100 mL/Hr IV Intermittent (20 @ 17:37)   100 mL/Hr IV Intermittent (20 @ 17:31)      ANTIMICROBIALS:      OTHER MEDS: MEDICATIONS  (STANDING):  acetaminophen   Tablet .. 650 every 6 hours PRN  amLODIPine   Tablet 5 daily  DULoxetine 60 daily  metoprolol succinate ER 25 daily  morphine  - Injectable 2 every 6 hours PRN  oxyCODONE    IR 5 every 6 hours PRN    SOCIAL HISTORY:  - lives with family in [place]  - former smoker, quit 40 years ago. Occ alcohol use  - denied vaping/recreational drug use    FAMILY HISTORY:  No pertinent family history in first degree relatives    REVIEW OF SYSTEMS  [  ] ROS unobtainable because:    [x] All other systems negative except as noted below:	  Constitutional:  [ ] fever [ ] chills  [ ] weight loss  [ ] weakness  Skin:  [ ] rash [ ] phlebitis	  Eyes: [ ] icterus [ ] pain  [ ] discharge	  ENMT: [ ] sore throat  [ ] thrush [ ] ulcers [ ] exudates  Respiratory: [ ] dyspnea [ ] hemoptysis [ ] cough [ ] sputum	  Cardiovascular:  [ ] chest pain [ ] palpitations [ ] edema	  Gastrointestinal:  [ ] nausea [ ] vomiting [ ] diarrhea [ ] constipation [x] pain	  Genitourinary:  [ ] dysuria [ ] frequency [ ] hematuria [ ] discharge [ ] flank pain  [ ] incontinence  Musculoskeletal:  [ ] myalgias [ ] arthralgias [ ] arthritis  [ ] back pain  Neurological:  [ ] headache [ ] seizures  [ ] confusion/altered mental status  Psychiatric:  [ ] anxiety [ ] depression	  Hematology/Lymphatics:  [ ] lymphadenopathy  Endocrine:  [ ] adrenal [ ] thyroid  Allergic/Immunologic:	 [ ] transplant [ ] seasonal    Vital Signs Last 24 Hrs  T(F): 98.6 (20 @ 12:34), Max: 99 (20 @ 22:12)  Vital Signs Last 24 Hrs  HR: 80 (20 @ 12:34) (76 - 92)  BP: 140/84 (20 @ 12:34) (139/91 - 161/76)  RR: 18 (20 @ 12:34)  SpO2: 98% (20 @ 12:34) (96% - 98%)  Wt(kg): --    EXAM:  Constitutional: Not in acute distress.  Eyes: pupils bilaterally reactive to light. No icterus.  Oral cavity: Clear, no lesions  Neck: No neck vein distension noted  RS: Chest clear to auscultation bilaterally. No wheeze/rhonchi/crepitations.  CVS: S1, S2 heard. Regular rate and rhythm. No murmurs/rubs/gallops. Lt chest port site appears clear  Abdomen: Soft. No guarding/rigidity/tenderness.  : No acute abnormalities  Extremities: Warm. No pedal edema  Skin: No lesions noted  Vascular: No evidence of phlebitis. Lt chest port site appears clear.  Neuro: Alert, oriented to time/place/person                          12.3   17.25 )-----------( 148      ( 2020 14:32 )             37.5         141  |  107  |  45<H>  ----------------------------<  160<H>  3.4<L>   |  16<L>  |  3.36<H>    Ca    8.8      2020 06:36      MICROBIOLOGY:  Culture - Urine (collected 2020 22:07)  Source: .Urine Clean Catch (Midstream)  Final Report (2020 21:51):    Normal Urogenital shae present      RADIOLOGY:  imaging below personally reviewed  < from: [facility] Duplex Lower Ext Vein Scan, Bilat (20 @ 14:47) >  IMPRESSION:  No evidence of deep venous thrombosis in either lower extremity.  < end of copied text >    < from: US Kidney and Bladder (20 @ 10:01) >  IMPRESSION:  Severe right hydronephrosis.  Left ureteral stent with moderate left hydronephrosis.  Heterogeneous posterior bladder wall mass compatible with known bladder neoplasm.  Right pleural effusion.  < end of copied text >    < from: Xray Chest 1 View- PORTABLE-Urgent (Xray Chest 1 View- PORTABLE-Urgent .) (20 @ 01:53) >  Impression:  Unchanged small right pleural effusion  < end of copied text >    < from: CT Abdomen and Pelvis No Cont (20 @ 17:36) >  IMPRESSION:  Large mesenteric hematoma. Additional hemoperitoneum.  Bladder mass and severe bilateral hydronephrosis.  < end of copied text >      OTHER TESTS:

## 2020-11-27 NOTE — PROGRESS NOTE ADULT - ASSESSMENT
70F with PMH of HTN, neuropathic pain, stage 4 colon cancer (initially dx 2004 s/p hemicolectomy, with recurrence in past years with mets to the bladder c/b hydronephrosis s/p R ureteral stent last exchanged 9/2020) last chemo with folfox on 10/19/20  p/w abdominal pain. Patient starting having lower abdominal pain yesterday and was told to come in to the ER for eval. Ct abd shows Status post right hemicolectomy with ileocolic anastomosis. High attenuating mesenteric hematoma with hematocrit level seen within the bowel mesentery measuring 5.2 x 3.2 cm. There is associated infiltration and edema within the bowel mesentery. No bowel obstruction.    1, Abdominal pain  - pt found to have mesenteric hematoma - stable   - surgery follow up recommended observation .  - hg on presentation was 6.4 s/p PRBC on admission   - Hb stable =13  - no abd symtpoms     2. leukocytosis   - clinically appears reactive - sec to malignancy ?   - no s/s of infection   - repeat CBC with diff, UA and CXR   - monitor temps     3. Colon cancer   - pt to follow up with [facility] after discharge     4. Elevated Cr  - pt with b/l hydro with stents   - pt known abnormal cr   - Urology eval noted - no acute intervention .     5. Bladder mass   - Pt had this in jan   - was biopsied and consistent with adeno   - will speak to [facility]     6. catheter assoc DVT   - reported catheter associated DVT on 4/17/20  - was on lovenox 50 mg daily   - will no longer need AC   - discussed wtih [facility] hematolgist     José Miguel See MD  [facility]   [phone]  cell: [phone]

## 2020-11-27 NOTE — PROGRESS NOTE ADULT - SUBJECTIVE AND OBJECTIVE BOX
[facility] NEPHROLOGY- PROGRESS NOTE    Patient is a 70y Female with baseline creatinine 2.0 (last taken on  at outpatient oncology) with metastatic colon cancer who presented to the hospital abdominal pain and was found to have mesenteric hematoma with severe anemia.  Her creatinine was elevated.  She has known bladder mass.  She has chronic L hydronephrosis with large ureteral stent that appears patent.  R severe hydronephrosis with severe cortical thinning, likely non-functional.  Pt has been making urine and has post void residual of ~ 120ml (per nursing).  No alberts catheter.    Pt feels okay. Abd pain has improved    REVIEW OF SYSTEMS:  CONSTITUTIONAL: + weakness, no fevers or chills  EYES/ENT: No visual changes;  No vertigo or throat pain   NECK: No pain or stiffness  RESPIRATORY: No cough, wheezing, hemoptysis; No shortness of breath  CARDIOVASCULAR: No chest pain or palpitations.  GASTROINTESTINAL: no current abdominal pain. No nausea, vomiting, or hematemesis; No diarrhea or constipation. No melena or hematochezia.  GENITOURINARY: + hematuria, mild dysuria  NEUROLOGICAL: No numbness or weakness  VASCULAR: No bilateral lower extremity edema.     VITALS:  T(F): 98.1 (20 @ 00:28), Max: 98.9 (20 @ 12:18)  HR: 76 (20 @ 00:28)  BP: 154/95 (20 @ 00:28)  RR: 18 (20 @ 00:28)  SpO2: 96% (20 @ 00:28)  Wt(kg): --     @ 07:  -   @ 07:00  --------------------------------------------------------  IN: 850 mL / OUT: 0 mL / NET: 850 mL     @ 07:  -   @ 04:27  --------------------------------------------------------  IN: 720 mL / OUT: 0 mL / NET: 720 mL        PHYSICAL EXAM:  Constitutional: NAD, thin  HEENT: anicteric sclera, oropharynx clear, MMM  Neck: No JVD  Respiratory: CTAB, no wheezes, rales or rhonchi  Cardiovascular: S1, S2, RRR  Gastrointestinal: BS+, soft, NT/ND  Extremities: No cyanosis or clubbing. No peripheral edema  Neurological: A/O x 3, though a bit confused about medical history, no focal deficits  Psychiatric: Normal mood, normal affect  : No CVA tenderness. No alberts. +suprapubic tenderness (moderate)  Skin: + R chest wall port benign    LABS: Pending today        141  |  109<H>  |  55<H>  ----------------------------<  89  3.7   |  19<L>  |  3.74<H>    Ca    8.7      2020 05:59      Creatinine Trend: 3.74 <--, 5.00 <--, 6.36 <--, 6.55 <--, 6.75 <--                        11.3   14.20 )-----------( 122      ( 2020 00:35 )             33.3     Urine Studies:  Urinalysis Basic - ( 2020 17:16 )    Color: Light Yellow / Appearance: Slightly Turbid / S.012 / pH:   Gluc:  / Ketone: Negative  / Bili: Negative / Urobili: Negative   Blood:  / Protein: 30 mg/dL / Nitrite: Negative   Leuk Esterase: Moderate / RBC: 9 /hpf / WBC 27 /HPF   Sq Epi:  / Non Sq Epi: 1 /hpf / Bacteria: Negative      Creatinine, Random Urine: 48 mg/dL ( @ 02:05)  Sodium, Random Urine: 49 mmol/L ( @ 02:05)

## 2020-11-27 NOTE — DISCHARGE NOTE PROVIDER - HOSPITAL COURSE
70F with PMH of HTN, neuropathic pain, stage 4 colon cancer (initially dx 2004 s/p hemicolectomy, with recurrence in past years with mets to the bladder c/b hydronephrosis s/p R ureteral stent last exchanged 9/2020) last chemo with folfox on 10/19/20  p/w abdominal pain and LEONEL vs CKD. Patient starting having lower abdominal pain yesterday and was told to come in to the ER for eval. Ct abd shows Status post right hemicolectomy with ileocolic anastomosis. High attenuating mesenteric hematoma with hematocrit level seen within the bowel mesentery measuring 5.2 x 3.2 cm. There is associated infiltration and edema within the bowel mesentery, and b/l hydronephrosis. No bowel obstruction.      Abdominal pain  - pt found to have mesenteric hematoma - stable   - surgery follow up recommended observation .  - hg on presentation was 6.4 s/p PRBC on admission   - Hb today at 9.3,   - repeat CT scan shows mesenteric hematoma improving   - no abd symtpoms   - pt will follow up at [facility] on 12/2 and will repeat Cbc there   - Ct also shows Distended endometrial cavity with heterogeneous intraluminal material. will follow up outpatient with GYN      leukocytosis   - clinically appears reactive - sec to malignancy ?   - no s/s of infection   - trending down    - chest xray negative   - monitor temps      Colon cancer   - pt to follow up with [facility] after discharge      Elevated Cr  - pt with b/l hydro with stents   - pt known abnormal cr. Thought to be from ATN now improving with IVF. Encourage PO intake. Avoid nephrotoxins. CKD-4: Baseline Scr 2.0. Patient advised to have outpatient work up. Pt also with metabolic acidosis, in the  setting of renal insufficiency. Start sodium bicarbonate 650 mg twice daily. Monitor CO2.  - Urology eval noted - no acute intervention     Bladder mass   - Pt had this in jan   - was biopsied and consistent with adeno     Catheter assoc DVT   - reported catheter associated DVT on 4/17/20  - was on lovenox 50 mg daily   - will no longer need AC   - discussed wtih [facility] hematolgist   Pt stable for discharge with follow up with her Oncologist at [facility]

## 2020-11-27 NOTE — DISCHARGE NOTE PROVIDER - NSDCMRMEDTOKEN_GEN_ALL_CORE_FT
DULoxetine 60 mg oral delayed release capsule: 1 cap(s) orally once a day  Lovenox 60 mg/0.6 mL injectable solution:   metoprolol succinate 25 mg oral tablet, extended release: 1 tab(s) orally once a day  Norvasc 5 mg oral tablet: 1 tab(s) orally once a day   acetaminophen 325 mg oral tablet: 2 tab(s) orally every 6 hours, As needed, Temp greater or equal to 38C (100.4F), Mild Pain (1 - 3)  DULoxetine 60 mg oral delayed release capsule: 1 cap(s) orally once a day  metoprolol succinate 25 mg oral tablet, extended release: 1 tab(s) orally once a day  Norvasc 5 mg oral tablet: 1 tab(s) orally once a day  sodium bicarbonate 650 mg oral tablet: 1 tab(s) orally 2 times a day

## 2020-11-28 LAB
ANION GAP SERPL CALC-SCNC: 16 MMOL/L — SIGNIFICANT CHANGE UP (ref 5–17)
APPEARANCE UR: ABNORMAL
BILIRUB UR-MCNC: NEGATIVE — SIGNIFICANT CHANGE UP
BUN SERPL-MCNC: 48 MG/DL — HIGH (ref 7–23)
CALCIUM SERPL-MCNC: 8.9 MG/DL — SIGNIFICANT CHANGE UP (ref 8.4–10.5)
CHLORIDE SERPL-SCNC: 107 MMOL/L — SIGNIFICANT CHANGE UP (ref 96–108)
CO2 SERPL-SCNC: 17 MMOL/L — LOW (ref 22–31)
COLOR SPEC: SIGNIFICANT CHANGE UP
CREAT SERPL-MCNC: 4.02 MG/DL — HIGH (ref 0.5–1.3)
DIFF PNL FLD: ABNORMAL
GLUCOSE SERPL-MCNC: 90 MG/DL — SIGNIFICANT CHANGE UP (ref 70–99)
GLUCOSE UR QL: NEGATIVE — SIGNIFICANT CHANGE UP
HCT VFR BLD CALC: 33.4 % — LOW (ref 34.5–45)
HGB BLD-MCNC: 11.1 G/DL — LOW (ref 11.5–15.5)
KETONES UR-MCNC: NEGATIVE — SIGNIFICANT CHANGE UP
LEUKOCYTE ESTERASE UR-ACNC: ABNORMAL
MAGNESIUM SERPL-MCNC: 1.5 MG/DL — LOW (ref 1.6–2.6)
MCHC RBC-ENTMCNC: 32.5 PG — SIGNIFICANT CHANGE UP (ref 27–34)
MCHC RBC-ENTMCNC: 33.2 GM/DL — SIGNIFICANT CHANGE UP (ref 32–36)
MCV RBC AUTO: 97.7 FL — SIGNIFICANT CHANGE UP (ref 80–100)
NITRITE UR-MCNC: NEGATIVE — SIGNIFICANT CHANGE UP
NRBC # BLD: 0 /100 WBCS — SIGNIFICANT CHANGE UP (ref 0–0)
PH UR: 5.5 — SIGNIFICANT CHANGE UP (ref 5–8)
PLATELET # BLD AUTO: 117 K/UL — LOW (ref 150–400)
POTASSIUM SERPL-MCNC: 4.1 MMOL/L — SIGNIFICANT CHANGE UP (ref 3.5–5.3)
POTASSIUM SERPL-SCNC: 4.1 MMOL/L — SIGNIFICANT CHANGE UP (ref 3.5–5.3)
PROT UR-MCNC: ABNORMAL
RBC # BLD: 3.42 M/UL — LOW (ref 3.8–5.2)
RBC # FLD: 17.4 % — HIGH (ref 10.3–14.5)
SODIUM SERPL-SCNC: 140 MMOL/L — SIGNIFICANT CHANGE UP (ref 135–145)
SP GR SPEC: 1.01 — SIGNIFICANT CHANGE UP (ref 1.01–1.02)
UROBILINOGEN FLD QL: SIGNIFICANT CHANGE UP
WBC # BLD: 16.69 K/UL — HIGH (ref 3.8–10.5)
WBC # FLD AUTO: 16.69 K/UL — HIGH (ref 3.8–10.5)

## 2020-11-28 RX ORDER — MAGNESIUM SULFATE 500 MG/ML
2 VIAL (ML) INJECTION ONCE
Refills: 0 | Status: COMPLETED | OUTPATIENT
Start: 2020-11-28 | End: 2020-11-28

## 2020-11-28 RX ORDER — SODIUM CHLORIDE 9 MG/ML
1000 INJECTION INTRAMUSCULAR; INTRAVENOUS; SUBCUTANEOUS
Refills: 0 | Status: DISCONTINUED | OUTPATIENT
Start: 2020-11-28 | End: 2020-11-29

## 2020-11-28 RX ORDER — ONDANSETRON 8 MG/1
4 TABLET, FILM COATED ORAL ONCE
Refills: 0 | Status: COMPLETED | OUTPATIENT
Start: 2020-11-28 | End: 2020-11-28

## 2020-11-28 RX ADMIN — CHLORHEXIDINE GLUCONATE 1 APPLICATION(S): 213 SOLUTION TOPICAL at 12:28

## 2020-11-28 RX ADMIN — Medication 25 MILLIGRAM(S): at 05:33

## 2020-11-28 RX ADMIN — AMLODIPINE BESYLATE 5 MILLIGRAM(S): 2.5 TABLET ORAL at 05:33

## 2020-11-28 RX ADMIN — SODIUM CHLORIDE 75 MILLILITER(S): 9 INJECTION INTRAMUSCULAR; INTRAVENOUS; SUBCUTANEOUS at 16:08

## 2020-11-28 RX ADMIN — Medication 50 GRAM(S): at 16:08

## 2020-11-28 RX ADMIN — DULOXETINE HYDROCHLORIDE 60 MILLIGRAM(S): 30 CAPSULE, DELAYED RELEASE ORAL at 12:26

## 2020-11-28 RX ADMIN — ONDANSETRON 4 MILLIGRAM(S): 8 TABLET, FILM COATED ORAL at 06:07

## 2020-11-28 NOTE — PROGRESS NOTE ADULT - ASSESSMENT
70 year-old woman with LEONEL, etiology most likely due to ATN, now improving significantly.     1. LEONEL likely 2/2 ATN- Renal fxn had improved, now creatinine slightly higher today.  Will give gentle IVFf. Monitor renal fxn.  2. CKD4- in the setting of known colon cancer s/p chemotherapy.  3. Chronic obstruction. F/u urology.  4. HTN- BP acceptable. Continue with current anti-hypertensive medications. Monitor BP.

## 2020-11-28 NOTE — PROGRESS NOTE ADULT - SUBJECTIVE AND OBJECTIVE BOX
Full note to follow. [facility] NEPHROLOGY- PROGRESS NOTE    Patient is a 70y Female with baseline creatinine 2.0 (last taken on  at outpatient oncology) with metastatic colon cancer who presented to the hospital abdominal pain and was found to have mesenteric hematoma with severe anemia.  Her creatinine was elevated.  She has known bladder mass.  She has chronic L hydronephrosis with large ureteral stent that appears patent.  R severe hydronephrosis with severe cortical thinning, likely non-functional.  Pt has been making urine and has post void residual of ~ 120ml (per nursing).  No alberts catheter.    Pt feels okay. Abd pain has improved    REVIEW OF SYSTEMS:  CONSTITUTIONAL: + weakness, no fevers or chills  EYES/ENT: No visual changes;  No vertigo or throat pain   NECK: No pain or stiffness  RESPIRATORY: No cough, wheezing, hemoptysis; No shortness of breath  CARDIOVASCULAR: No chest pain or palpitations.  GASTROINTESTINAL: no current abdominal pain. No nausea, vomiting, or hematemesis; No diarrhea or constipation. No melena or hematochezia.  GENITOURINARY: + hematuria, mild dysuria  NEUROLOGICAL: No numbness or weakness  VASCULAR: No bilateral lower extremity edema.     VITALS:  T(F): 98.2 (20 @ 20:28), Max: 98.8 (20 @ 12:09)  HR: 86 (20 @ 20:28)  BP: 156/88 (20 @ 20:28)  RR: 18 (20 @ 20:28)  SpO2: 96% (20 @ 20:28)  Wt(kg): --     @ 07:01  -   @ 07:00  --------------------------------------------------------  IN: 720 mL / OUT: 0 mL / NET: 720 mL      PHYSICAL EXAM:  Constitutional: NAD, thin  HEENT: anicteric sclera, oropharynx clear, MMM  Neck: No JVD  Respiratory: CTAB, no wheezes, rales or rhonchi  Cardiovascular: S1, S2, RRR  Gastrointestinal: BS+, soft, NT/ND  Extremities: No cyanosis or clubbing. No peripheral edema  Neurological: A/O x 3, though a bit confused about medical history, no focal deficits  Psychiatric: Normal mood, normal affect  : No CVA tenderness. No alberts. no tenderness.  Skin: + R chest wall port benign      LABS:      140  |  107  |  48<H>  ----------------------------<  90  4.1   |  17<L>  |  4.02<H>    Ca    8.9      2020 06:55  Mg     1.5           Creatinine Trend: 4.02 <--, 3.36 <--, 3.74 <--, 5.00 <--, 6.36 <--, 6.55 <--, 6.75 <--                        11.1   16.69 )-----------( 117      ( 2020 06:55 )             33.4     Urine Studies:  Urinalysis Basic - ( 2020 19:38 )    Color: Light Yellow / Appearance: Slightly Turbid / S.012 / pH:   Gluc:  / Ketone: Negative  / Bili: Negative / Urobili: <2 mg/dL   Blood:  / Protein: 30 mg/dL / Nitrite: Negative   Leuk Esterase: Large / RBC: 7 /HPF / WBC 54 /HPF   Sq Epi:  / Non Sq Epi: 1 /HPF / Bacteria: Negative      Creatinine, Random Urine: 48 mg/dL ( @ 02:05)  Sodium, Random Urine: 49 mmol/L ( @ 02:05)

## 2020-11-28 NOTE — PROGRESS NOTE ADULT - SUBJECTIVE AND OBJECTIVE BOX
MARISOL [id number]  70yFemale  T(C): 37 (11-28-20 @ 08:04), Max: 37 (11-27-20 @ 12:34)  HR: 94 (11-28-20 @ 08:04) (80 - 99)  BP: 161/79 (11-28-20 @ 08:04) (140/84 - 163/90)  RR: 18 (11-28-20 @ 08:04) (18 - 18)  SpO2: 95% (11-28-20 @ 08:04) (95% - 98%)  Wt(kg): --  11-27 @ 07:01  -  11-28 @ 07:00  --------------------------------------------------------  IN: 720 mL / OUT: 0 mL / NET: 720 mL      normal cephalic atraumatic  s1s2   clear to ascultation bilaterally  soft, non tender, non distended no guarding or rebound  no clubbing cyanosis or edema

## 2020-11-28 NOTE — PROGRESS NOTE ADULT - ASSESSMENT
70F with PMH of HTN, neuropathic pain, stage 4 colon cancer (initially dx 2004 s/p hemicolectomy, with recurrence in past years with mets to the bladder c/b hydronephrosis s/p R ureteral stent last exchanged 9/2020) last chemo with folfox on 10/19/20  p/w abdominal pain. Patient starting having lower abdominal pain yesterday and was told to come in to the ER for eval. Ct abd shows Status post right hemicolectomy with ileocolic anastomosis. High attenuating mesenteric hematoma with hematocrit level seen within the bowel mesentery measuring 5.2 x 3.2 cm. There is associated infiltration and edema within the bowel mesentery. No bowel obstruction.    1, Abdominal pain  - pt found to have mesenteric hematoma - stable   - surgery follow up recommended observation .  - hg on presentation was 6.4 s/p PRBC on admission   - Hb today at 11.1, stable BP and HR  - no abd symtpoms   - Continue to montior    2. leukocytosis   - clinically appears reactive - sec to malignancy ?   - no s/s of infection   - repeat CBC with diff, UA and CXR   - monitor temps     3. Colon cancer   - pt to follow up with [facility] after discharge     4. Elevated Cr  - pt with b/l hydro with stents   - pt known abnormal cr   - Urology eval noted - no acute intervention    5. Bladder mass   - Pt had this in jan   - was biopsied and consistent with adeno     6. Catheter assoc DVT   - reported catheter associated DVT on 4/17/20  - was on lovenox 50 mg daily   - will no longer need AC   - discussed wtih [facility] hematolgist       Sarbjit Krishnan MD  Hematology/Oncology Consultant  [facility]  Cell: [phone]

## 2020-11-28 NOTE — PROGRESS NOTE ADULT - SUBJECTIVE AND OBJECTIVE BOX
SUBJECTIVE / OVERNIGHT EVENTS: pt denies abd pain , + flatus, denies nausea, v    MEDICATIONS  (STANDING):  amLODIPine   Tablet 5 milliGRAM(s) Oral daily  chlorhexidine 4% Liquid 1 Application(s) Topical daily  DULoxetine 60 milliGRAM(s) Oral daily  metoprolol succinate ER 25 milliGRAM(s) Oral daily  sodium chloride 0.9%. 1000 milliLiter(s) (75 mL/Hr) IV Continuous <Continuous>    MEDICATIONS  (PRN):  acetaminophen   Tablet .. 650 milliGRAM(s) Oral every 6 hours PRN Temp greater or equal to 38C (100.4F), Mild Pain (1 - 3)  morphine  - Injectable 2 milliGRAM(s) IV Push every 6 hours PRN Severe Pain (7 - 10)  oxyCODONE    IR 5 milliGRAM(s) Oral every 6 hours PRN Moderate Pain (4 - 6)    Vital Signs Last 24 Hrs  T(C): 36.8 (2020 20:28), Max: 37.1 (2020 12:09)  T(F): 98.2 (2020 20:28), Max: 98.8 (2020 12:09)  HR: 86 (2020 20:28) (82 - 99)  BP: 156/88 (2020 20:28) (143/73 - 161/79)  BP(mean): --  RR: 18 (2020 20:28) (18 - 18)  SpO2: 96% (2020 20:28) (95% - 99%)    Constitutional: No fever, fatigue  Skin: No rash.  Eyes: No recent vision problems or eye pain.  ENT: No congestion, ear pain, or sore throat.  Cardiovascular: No chest pain or palpation.  Respiratory: No cough, shortness of breath, congestion, or wheezing.  Gastrointestinal: No abdominal pain, nausea, vomiting, or diarrhea.  Genitourinary: No dysuria.  Musculoskeletal: No joint swelling.  Neurologic: No headache.    PHYSICAL EXAM:  GENERAL: NAD  EYES: EOMI, PERRLA  NECK: Supple, No JVD  CHEST/LUNG: dec breath sounds at bases   HEART:  S1 , S2 +  ABDOMEN: soft , bs+, mild distension , no tenderness, no guarding   EXTREMITIES:  no edema  NEUROLOGY:alert awake    LABS:      140  |  107  |  48<H>  ----------------------------<  90  4.1   |  17<L>  |  4.02<H>    Ca    8.9      2020 06:55  Mg     1.5           Creatinine Trend: 4.02 <--, 3.36 <--, 3.74 <--, 5.00 <--, 6.36 <--, 6.55 <--, 6.75 <--                        11.1   16.69 )-----------( 117      ( 2020 06:55 )             33.4     Urine Studies:  Urinalysis Basic - ( 2020 19:38 )    Color: Light Yellow / Appearance: Slightly Turbid / S.012 / pH:   Gluc:  / Ketone: Negative  / Bili: Negative / Urobili: <2 mg/dL   Blood:  / Protein: 30 mg/dL / Nitrite: Negative   Leuk Esterase: Large / RBC: 7 /HPF / WBC 54 /HPF   Sq Epi:  / Non Sq Epi: 1 /HPF / Bacteria: Negative      Creatinine, Random Urine: 48 mg/dL ( @ 02:05)  Sodium, Random Urine: 49 mmol/L ( @ 02:05)

## 2020-11-28 NOTE — PROGRESS NOTE ADULT - SUBJECTIVE AND OBJECTIVE BOX
Patient is a 70y old  Female who presents with a chief complaint of abdominal pain (28 Nov 2020 09:17)      MEDICATIONS  (STANDING):  amLODIPine   Tablet 5 milliGRAM(s) Oral daily  chlorhexidine 4% Liquid 1 Application(s) Topical daily  dextrose 5% + sodium chloride 0.9%. 1000 milliLiter(s) (75 mL/Hr) IV Continuous <Continuous>  DULoxetine 60 milliGRAM(s) Oral daily  metoprolol succinate ER 25 milliGRAM(s) Oral daily    MEDICATIONS  (PRN):  acetaminophen   Tablet .. 650 milliGRAM(s) Oral every 6 hours PRN Temp greater or equal to 38C (100.4F), Mild Pain (1 - 3)  morphine  - Injectable 2 milliGRAM(s) IV Push every 6 hours PRN Severe Pain (7 - 10)  oxyCODONE    IR 5 milliGRAM(s) Oral every 6 hours PRN Moderate Pain (4 - 6)      Interim History:  No acute events over night. Vitals stable. Patient in no acute distress      Vital Signs Last 24 Hrs  T(C): 37.1 (28 Nov 2020 12:09), Max: 37.1 (28 Nov 2020 12:09)  T(F): 98.8 (28 Nov 2020 12:09), Max: 98.8 (28 Nov 2020 12:09)  HR: 82 (28 Nov 2020 12:09) (82 - 99)  BP: 143/84 (28 Nov 2020 12:09) (143/73 - 163/90)  BP(mean): --  RR: 18 (28 Nov 2020 12:09) (18 - 18)  SpO2: 99% (28 Nov 2020 12:09) (95% - 99%)        PE  NAD  Awake, alert  Anicteric, MMM  RRR  CTAB  Abd soft, NT, ND  No c/c/e  No rash grossly                            11.1   16.69 )-----------( 117      ( 28 Nov 2020 06:55 )             33.4       11-28    140  |  107  |  48<H>  ----------------------------<  90  4.1   |  17<L>  |  4.02<H>    Ca    8.9      28 Nov 2020 06:55  Mg     1.5     11-28

## 2020-11-29 LAB
ANION GAP SERPL CALC-SCNC: 14 MMOL/L — SIGNIFICANT CHANGE UP (ref 5–17)
BLD GP AB SCN SERPL QL: NEGATIVE — SIGNIFICANT CHANGE UP
BUN SERPL-MCNC: 43 MG/DL — HIGH (ref 7–23)
CALCIUM SERPL-MCNC: 9.1 MG/DL — SIGNIFICANT CHANGE UP (ref 8.4–10.5)
CHLORIDE SERPL-SCNC: 109 MMOL/L — HIGH (ref 96–108)
CO2 SERPL-SCNC: 18 MMOL/L — LOW (ref 22–31)
CREAT SERPL-MCNC: 3.78 MG/DL — HIGH (ref 0.5–1.3)
CULTURE RESULTS: NO GROWTH — SIGNIFICANT CHANGE UP
GLUCOSE SERPL-MCNC: 91 MG/DL — SIGNIFICANT CHANGE UP (ref 70–99)
HCT VFR BLD CALC: 27.5 % — LOW (ref 34.5–45)
HCT VFR BLD CALC: 30.1 % — LOW (ref 34.5–45)
HGB BLD-MCNC: 8.9 G/DL — LOW (ref 11.5–15.5)
HGB BLD-MCNC: 9.7 G/DL — LOW (ref 11.5–15.5)
MAGNESIUM SERPL-MCNC: 2.4 MG/DL — SIGNIFICANT CHANGE UP (ref 1.6–2.6)
MCHC RBC-ENTMCNC: 32.2 GM/DL — SIGNIFICANT CHANGE UP (ref 32–36)
MCHC RBC-ENTMCNC: 32.2 PG — SIGNIFICANT CHANGE UP (ref 27–34)
MCHC RBC-ENTMCNC: 32.2 PG — SIGNIFICANT CHANGE UP (ref 27–34)
MCHC RBC-ENTMCNC: 32.4 GM/DL — SIGNIFICANT CHANGE UP (ref 32–36)
MCV RBC AUTO: 100 FL — SIGNIFICANT CHANGE UP (ref 80–100)
MCV RBC AUTO: 99.6 FL — SIGNIFICANT CHANGE UP (ref 80–100)
NRBC # BLD: 0 /100 WBCS — SIGNIFICANT CHANGE UP (ref 0–0)
NRBC # BLD: 0 /100 WBCS — SIGNIFICANT CHANGE UP (ref 0–0)
PLATELET # BLD AUTO: 110 K/UL — LOW (ref 150–400)
PLATELET # BLD AUTO: 111 K/UL — LOW (ref 150–400)
POTASSIUM SERPL-MCNC: 4.2 MMOL/L — SIGNIFICANT CHANGE UP (ref 3.5–5.3)
POTASSIUM SERPL-SCNC: 4.2 MMOL/L — SIGNIFICANT CHANGE UP (ref 3.5–5.3)
RBC # BLD: 2.76 M/UL — LOW (ref 3.8–5.2)
RBC # BLD: 3.01 M/UL — LOW (ref 3.8–5.2)
RBC # FLD: 17.1 % — HIGH (ref 10.3–14.5)
RBC # FLD: 17.1 % — HIGH (ref 10.3–14.5)
RH IG SCN BLD-IMP: POSITIVE — SIGNIFICANT CHANGE UP
SODIUM SERPL-SCNC: 141 MMOL/L — SIGNIFICANT CHANGE UP (ref 135–145)
SPECIMEN SOURCE: SIGNIFICANT CHANGE UP
WBC # BLD: 12.37 K/UL — HIGH (ref 3.8–10.5)
WBC # BLD: 12.64 K/UL — HIGH (ref 3.8–10.5)
WBC # FLD AUTO: 12.37 K/UL — HIGH (ref 3.8–10.5)
WBC # FLD AUTO: 12.64 K/UL — HIGH (ref 3.8–10.5)

## 2020-11-29 PROCEDURE — 74176 CT ABD & PELVIS W/O CONTRAST: CPT | Mod: 26

## 2020-11-29 PROCEDURE — 99232 SBSQ HOSP IP/OBS MODERATE 35: CPT

## 2020-11-29 RX ORDER — SODIUM CHLORIDE 9 MG/ML
1000 INJECTION INTRAMUSCULAR; INTRAVENOUS; SUBCUTANEOUS
Refills: 0 | Status: DISCONTINUED | OUTPATIENT
Start: 2020-11-29 | End: 2020-11-30

## 2020-11-29 RX ADMIN — SODIUM CHLORIDE 60 MILLILITER(S): 9 INJECTION INTRAMUSCULAR; INTRAVENOUS; SUBCUTANEOUS at 17:58

## 2020-11-29 RX ADMIN — DULOXETINE HYDROCHLORIDE 60 MILLIGRAM(S): 30 CAPSULE, DELAYED RELEASE ORAL at 11:42

## 2020-11-29 RX ADMIN — Medication 25 MILLIGRAM(S): at 05:44

## 2020-11-29 RX ADMIN — CHLORHEXIDINE GLUCONATE 1 APPLICATION(S): 213 SOLUTION TOPICAL at 11:42

## 2020-11-29 RX ADMIN — AMLODIPINE BESYLATE 5 MILLIGRAM(S): 2.5 TABLET ORAL at 05:44

## 2020-11-29 NOTE — PROGRESS NOTE ADULT - ASSESSMENT
70 year-old woman with LEONEL, etiology most likely due to ATN, now improving significantly.     1. LEONEL likely 2/2 ATN- Renal fxn had improved, then creatinine slightly, improved again with IVF.  Will give gentle IVF continuously for now. Monitor renal fxn.  2. CKD4- in the setting of known colon cancer s/p chemotherapy.  3. Chronic obstruction. F/u urology.  4. HTN- BP acceptable. Continue with current anti-hypertensive medications. Monitor BP.

## 2020-11-29 NOTE — PROGRESS NOTE ADULT - PROBLEM SELECTOR PLAN 8
BP elevated above goal  resume home medications  monitor closely

## 2020-11-29 NOTE — PROGRESS NOTE ADULT - PROBLEM SELECTOR PROBLEM 7
Chronic deep vein thrombosis (DVT) of non-extremity vein

## 2020-11-29 NOTE — PROGRESS NOTE ADULT - PROBLEM SELECTOR PROBLEM 1
Acute blood loss anemia

## 2020-11-29 NOTE — PROGRESS NOTE ADULT - PROBLEM SELECTOR PLAN 3
imaging with mesenteric hematoma and hemoperitoneum in setting of lovenox use, likely cause of pts abdominal pain and anemia   - surgery following, no plans for surgical intervention  - bowel rest, NPO/IVF,  transfusions and serial H/H  - if patient does not respond appropriately to blood transfusions, will [id number] IR consultation for angiography and possible embolizations  - surgery to c/t follow  - pain control with tylenol, oxycodone or morphine as needed
imaging with mesenteric hematoma and hemoperitoneum in setting of lovenox use, likely cause of pts abdominal pain and anemia   - surgery following, no plans for surgical intervention  - bowel rest, NPO/IVF,  transfusions and serial H/H  - if patient does not respond appropriately to blood transfusions, will [id number] IR consultation for angiography and possible embolizations  - surgery to c/t follow  - pain control with tylenol, oxycodone or morphine as needed
imaging with mesenteric hematoma and hemoperitoneum in setting of lovenox use, likely cause of pts abdominal pain and anemia   - surgery following, no plans for surgical intervention  as per heme no need for ac , repeat abd ct given slow drop in hb
imaging with mesenteric hematoma and hemoperitoneum in setting of lovenox use, likely cause of pts abdominal pain and anemia   - surgery following, no plans for surgical intervention  discuss with surgery about restarting ac

## 2020-11-29 NOTE — PROGRESS NOTE ADULT - SUBJECTIVE AND OBJECTIVE BOX
Full note to follow.    Give IVF [facility] NEPHROLOGY- PROGRESS NOTE    Patient is a 70y Female with baseline creatinine 2.0 (last taken on  at outpatient oncology) with metastatic colon cancer who presented to the hospital abdominal pain and was found to have mesenteric hematoma with severe anemia.  Her creatinine was elevated.  She has known bladder mass.  She has chronic L hydronephrosis with large ureteral stent that appears patent.  R severe hydronephrosis with severe cortical thinning, likely non-functional.  Pt has been making urine and has post void residual of ~ 120ml (per nursing).  No alberts catheter.    Pt feels okay. Abd pain has improved.  She tolerated IVF given yesterday well.    REVIEW OF SYSTEMS:  CONSTITUTIONAL: + weakness, no fevers or chills  EYES/ENT: No visual changes;  No vertigo or throat pain   NECK: No pain or stiffness  RESPIRATORY: No cough, wheezing, hemoptysis; No shortness of breath  CARDIOVASCULAR: No chest pain or palpitations.  GASTROINTESTINAL: no current abdominal pain. No nausea, vomiting, or hematemesis; No diarrhea or constipation. No melena or hematochezia.  GENITOURINARY: no hematuria, no dysuria now  NEUROLOGICAL: No numbness or weakness  VASCULAR: No bilateral lower extremity edema.     VITALS:  T(F): 98.2 (20 @ 00:27), Max: 98.2 (20 @ 08:35)  HR: 89 (20 @ 00:27)  BP: 155/87 (20 @ 00:27)  RR: 18 (20 @ 00:27)  SpO2: 97% (20 @ 00:27)  Wt(kg): --     @ 07:  -   @ 07:00  --------------------------------------------------------  IN: 1260 mL / OUT: 0 mL / NET: 1260 mL     @ 07:01  -   @ 00:55  --------------------------------------------------------  IN: 360 mL / OUT: 0 mL / NET: 360 mL        PHYSICAL EXAM:  Constitutional: NAD, thin  HEENT: anicteric sclera, oropharynx clear, MMM  Neck: No JVD  Respiratory: CTAB, no wheezes, rales or rhonchi  Cardiovascular: S1, S2, RRR  Gastrointestinal: BS+, soft, NT/ND  Extremities: No cyanosis or clubbing. No peripheral edema  Neurological: A/O x 3, though a bit confused about medical history, no focal deficits  Psychiatric: Normal mood, normal affect  : No CVA tenderness. No alberts. no tenderness.  Skin: + R chest wall port benign      LABS:      141  |  109<H>  |  43<H>  ----------------------------<  91  4.2   |  18<L>  |  3.78<H>    Ca    9.1      2020 06:38  Mg     2.4           Creatinine Trend: 3.78 <--, 4.02 <--, 3.36 <--, 3.74 <--, 5.00 <--, 6.36 <--, 6.55 <--, 6.75 <--                        8.9    12.37 )-----------( 111      ( 2020 21:44 )             27.5     Urine Studies:  Urinalysis Basic - ( 2020 19:38 )    Color: Light Yellow / Appearance: Slightly Turbid / S.012 / pH:   Gluc:  / Ketone: Negative  / Bili: Negative / Urobili: <2 mg/dL   Blood:  / Protein: 30 mg/dL / Nitrite: Negative   Leuk Esterase: Large / RBC: 7 /HPF / WBC 54 /HPF   Sq Epi:  / Non Sq Epi: 1 /HPF / Bacteria: Negative      Creatinine, Random Urine: 48 mg/dL ( @ 02:05)  Sodium, Random Urine: 49 mmol/L ( @ 02:05)

## 2020-11-29 NOTE — PROGRESS NOTE ADULT - SUBJECTIVE AND OBJECTIVE BOX
70yPatient is a 70y old  Female who presents with a chief complaint of abdominal pain (29 Nov 2020 10:32)      Interval history:  Feeling well.  Wants to go home.  No fevers        Antimicrobials:  s/p ceftriaxone 11/23-11/26    MEDICATIONS  (STANDING):  acetaminophen   Tablet .. 650 every 6 hours PRN  amLODIPine   Tablet 5 daily  DULoxetine 60 daily  metoprolol succinate ER 25 daily  morphine  - Injectable 2 every 6 hours PRN  oxyCODONE    IR 5 every 6 hours PRN        Vital Signs Last 24 Hrs  T(C): 36.8 (11-29-20 @ 08:35), Max: 37.1 (11-28-20 @ 12:09)  T(F): 98.2 (11-29-20 @ 08:35), Max: 98.8 (11-28-20 @ 12:09)  HR: 89 (11-29-20 @ 08:35) (82 - 90)  BP: 149/89 (11-29-20 @ 08:35) (143/84 - 156/88)  BP(mean): --  RR: 19 (11-29-20 @ 08:35) (18 - 19)  SpO2: 99% (11-29-20 @ 08:35) (96% - 99%)      EXAM:  Constitutional: Not in acute distress.  Eyes: pupils bilaterally reactive to light. No icterus.  Oral cavity: Clear, no lesions  Neck: No neck vein distension noted  RS: Chest clear to auscultation bilaterally. No wheeze/rhonchi/crepitations.  CVS: S1, S2 heard. Regular rate and rhythm. No murmurs/rubs/gallops. Lt chest port site appears clear  Abdomen: Soft. No guarding/rigidity/tenderness.  : No acute abnormalities  Extremities: Warm. No pedal edema  Skin: No lesions noted  Vascular: No evidence of phlebitis. Lt chest port site appears clear.  Neuro: Alert, oriented to time/place/person                          9.7    12.64 )-----------( 110      ( 29 Nov 2020 06:38 )             30.1   11-29    141  |  109<H>  |  43<H>  ----------------------------<  91  4.2   |  18<L>  |  3.78<H>    Ca    9.1      29 Nov 2020 06:38  Mg     2.4     11-29            RECENT CULTURES:      Culture - Urine (collected 28 Nov 2020 08:58)  Source: .Urine Clean Catch (Midstream)  Final Report (29 Nov 2020 07:52):    No growth    Culture - Blood (collected 27 Nov 2020 18:29)  Source: .Blood Port Device  Preliminary Report (28 Nov 2020 19:01):    No growth to date.    Culture - Blood (collected 27 Nov 2020 18:29)  Source: .Blood Blood-Peripheral  Preliminary Report (28 Nov 2020 19:01):    No growth to date.            Radiology:  ra< from: Xray Chest 1 View AP/PA (11.27.20 @ 14:03) >  IMPRESSION: Small layering right pleural effusion. Otherwise, clear lungs.    < end of copied text >      < from: [facility] Duplex Lower Ext Vein Scan, Bilat (11.25.20 @ 14:47) >  IMPRESSION:  No evidence of deep venous thrombosis in either lower extremity.    < end of copied text >      < from: US Kidney and Bladder (11.24.20 @ 10:01) >  MPRESSION:    Severe right hydronephrosis.    Left ureteral stent with moderate left hydronephrosis.    Heterogeneous posterior bladder wall mass compatible with known bladder neoplasm.    Right pleural effusion.    < end of copied text >      < from: CT Abdomen and Pelvis No Cont (11.23.20 @ 17:36) >  IMPRESSION:    Large mesenteric hematoma. Additional hemoperitoneum.    Bladder mass and severe bilateral hydronephrosis.    < end of copied text >

## 2020-11-29 NOTE — PROGRESS NOTE ADULT - SUBJECTIVE AND OBJECTIVE BOX
MARISOL [id number]  70yFemale  T(C): 36.8 (11-29-20 @ 08:35), Max: 37.1 (11-28-20 @ 12:09)  HR: 89 (11-29-20 @ 08:35) (82 - 90)  BP: 149/89 (11-29-20 @ 08:35) (143/84 - 156/88)  RR: 19 (11-29-20 @ 08:35) (18 - 19)  SpO2: 99% (11-29-20 @ 08:35) (96% - 99%)  Wt(kg): --  11-28 @ 07:01  -  11-29 @ 07:00  --------------------------------------------------------  IN: 1260 mL / OUT: 0 mL / NET: 1260 mL      normal cephalic atraumatic  s1s2   clear to ascultation bilaterally  soft, non tender, non distended no guarding or rebound  no clubbing cyanosis or edema

## 2020-11-29 NOTE — PROGRESS NOTE ADULT - PROBLEM SELECTOR PLAN 4
b/l severe hydronephrosis R>L with R ureteral stent noted; per pt, last exchanged 9/2020. Unclear if L hydro is acute or chronic.   - urology following, no plan for intervention at this time     - strict I/Os  - frequent bladder scans, PVRs   - monitor Cr and electrolytes closely

## 2020-11-29 NOTE — PROGRESS NOTE ADULT - PROBLEM SELECTOR PLAN 5
c/w ceftriaxone   f/u Cx and tailor abx

## 2020-11-29 NOTE — PROGRESS NOTE ADULT - PROBLEM SELECTOR PLAN 10
no pharm AC 2/2 acute anemia/hematoma; SCDs  NPO for now
no pharm AC 2/2 acute anemia/hematoma; SCDs
no pharm AC 2/2 acute anemia/hematoma; SCDs  NPO for now

## 2020-11-29 NOTE — PROGRESS NOTE ADULT - SUBJECTIVE AND OBJECTIVE BOX
Patient seen and examined at bedside. pt feels well. wants to go home.     MEDICATIONS  (STANDING):  amLODIPine   Tablet 5 milliGRAM(s) Oral daily  chlorhexidine 4% Liquid 1 Application(s) Topical daily  DULoxetine 60 milliGRAM(s) Oral daily  metoprolol succinate ER 25 milliGRAM(s) Oral daily  sodium chloride 0.9%. 1000 milliLiter(s) (75 mL/Hr) IV Continuous <Continuous>    MEDICATIONS  (PRN):  acetaminophen   Tablet .. 650 milliGRAM(s) Oral every 6 hours PRN Temp greater or equal to 38C (100.4F), Mild Pain (1 - 3)  morphine  - Injectable 2 milliGRAM(s) IV Push every 6 hours PRN Severe Pain (7 - 10)  oxyCODONE    IR 5 milliGRAM(s) Oral every 6 hours PRN Moderate Pain (4 - 6)        Vital Signs Last 24 Hrs  T(C): 36.8 (29 Nov 2020 08:35), Max: 37.1 (28 Nov 2020 12:09)  T(F): 98.2 (29 Nov 2020 08:35), Max: 98.8 (28 Nov 2020 12:09)  HR: 89 (29 Nov 2020 08:35) (82 - 90)  BP: 149/89 (29 Nov 2020 08:35) (143/84 - 156/88)  BP(mean): --  RR: 19 (29 Nov 2020 08:35) (18 - 19)  SpO2: 99% (29 Nov 2020 08:35) (96% - 99%)      PHYSICAL EXAM:     GENERAL:  Appears stated age, well-groomed  HEENT:  NC/AT,    CHEST:  CTA b/l  HEART:  S1 s2+   ABDOMEN:  Soft, non-tender, non-distended  EXTEREMITIES:  no cyanosis,clubbing or edema                              9.7    12.64 )-----------( 110      ( 29 Nov 2020 06:38 )             30.1       11-29    141  |  109<H>  |  43<H>  ----------------------------<  91  4.2   |  18<L>  |  3.78<H>    Ca    9.1      29 Nov 2020 06:38  Mg     2.4     11-29

## 2020-11-29 NOTE — PROGRESS NOTE ADULT - PROBLEM SELECTOR PROBLEM 2
LEONEL (acute kidney injury)

## 2020-11-29 NOTE — PROGRESS NOTE ADULT - PROBLEM SELECTOR PLAN 2
as per renal , Continue IVF. F/u urology. Renal fxn staring to improve. If renal fxn does not improve further, will have to discuss GOC re: RRT.
LEONEL vs CKD - unclear baseline Cr, but elevated to 6.75 with metabolic acidosis and hyperkalemia. HyperK resolved in ED s/p insulin/bicarb. Likely 2/2 severe b/l hydro and bladder mass  - check urine lytes and renal US  - f/u with PMD/urology regarding baseline Cr  - may need decompression with nephrostomy tubes - urology following   - strict I/Os, monitor PVR, monitor CR closely, renally dose medications, avoid nephrotoxins   - renal consult
as per renal , Continue IVF. F/u urology. Renal fxn staring to improve. If renal fxn does not improve further, will have to discuss GOC re: RRT.
improving

## 2020-11-29 NOTE — PROGRESS NOTE ADULT - PROBLEM SELECTOR PROBLEM 4
Bilateral hydronephrosis

## 2020-11-29 NOTE — PROGRESS NOTE ADULT - PROBLEM SELECTOR PLAN 7
hx of neck vein DVT per pt report, on lovenox (unknown dose)  holding AC for now

## 2020-11-29 NOTE — PROGRESS NOTE ADULT - PROBLEM SELECTOR PROBLEM 3
Mesenteric hematoma, initial encounter

## 2020-11-29 NOTE — PROGRESS NOTE ADULT - ASSESSMENT
70F with PMH of HTN, neuropathic pain, stage 4 colon cancer (initially dx 2004 s/p hemicolectomy, with recurrence in past years with mets to the bladder c/b hydronephrosis s/p R ureteral stent last exchanged 9/2020) last chemo with folfox on 10/19/20  p/w abdominal pain. Patient starting having lower abdominal pain yesterday and was told to come in to the ER for eval. Ct abd shows Status post right hemicolectomy with ileocolic anastomosis. High attenuating mesenteric hematoma with hematocrit level seen within the bowel mesentery measuring 5.2 x 3.2 cm. There is associated infiltration and edema within the bowel mesentery. No bowel obstruction.    1, Abdominal pain  - pt found to have mesenteric hematoma - stable   - surgery follow up recommended observation .  - hg on presentation was 6.4 s/p PRBC on admission   - Hb today at 9.7, has been down trending again   - may need IR for embolization , repeat CT scan?  - no abd symtpoms   - Continue to montior    2. leukocytosis   - clinically appears reactive - sec to malignancy ?   - no s/s of infection   - trending down    - chest xray negative   - monitor temps     3. Colon cancer   - pt to follow up with [facility] after discharge     4. Elevated Cr  - pt with b/l hydro with stents   - pt known abnormal cr   - Urology eval noted - no acute intervention    5. Bladder mass   - Pt had this in jan   - was biopsied and consistent with adeno     6. Catheter assoc DVT   - reported catheter associated DVT on 4/17/20  - was on lovenox 50 mg daily   - will no longer need AC   - discussed wtih [facility] hematolgist     Maldonado Chacon MD  Hematology Oncology   [phone]

## 2020-11-29 NOTE — PROGRESS NOTE ADULT - PROBLEM SELECTOR PROBLEM 6
Malignant neoplasm of colon, unspecified part of colon

## 2020-11-29 NOTE — PROGRESS NOTE ADULT - PROBLEM SELECTOR PLAN 1
acute anemia with Hb 6.4, unclear baseline - likely in setting of mesenteric hematoma while on full AC. remains HD stable     - s/p 1u PRBC in ED, will f/u post transfusion CBC   - monitor CBC q6-8 hours for now to assess for stability  - active T&S  - surgery following; may need IR consult for embolization if not responding to transfusions
acute anemia with Hb 6.4, unclear baseline - likely in setting of mesenteric hematoma while on full AC. remains HD stable     - s/p 1u PRBC in ED, will f/u post transfusion CBC   - monitor CBC q6-8 hours for now to assess for stability  - active T&S  - surgery following; may need IR consult for embolization if not responding to transfusions
hb stable  no signs of active bleeding at present
hb stable  no signs of active bleeding at present  leukocytosis +- pt afebrile, f/u cx , ID f/u
hb stable  no signs of active bleeding at present  leukocytosis +- pt afebrile, f/u cx , ID f/u
hb stable  no signs of active bleeding at present  leukocytosis +- pt afebrile, f/u cx , ID f/u- abd ct

## 2020-11-29 NOTE — PROGRESS NOTE ADULT - ASSESSMENT
70 F with PMH HTN, neuropathic pain, stage 4 colon cancer (dx 2004 s/p hemicolectomy), with bladder recurrence c/b hydronephrosis s/p Rt ureteral stent (last exchanged 9/2020) currently on chemo (last 4-5 weeks PTA at [facility]) through left chest port (placed 2019), LE DVT on LMWH.    Leukocytosis, no fever    CT with mesenteric hematoma and hemoperitoneum    Patient generally well appearing without focal signs infection; no symptoms PNA, UTI, minimal pain    Note presence of mediport    With no overt signs infection, suspect leukocytosis reactive to hematoma    Overall  1) Leukocytosis  - Monitor closely for signs infection; presently suspect reactive picture 2/2 hematoma rather than true infection  - Off antibiotics since 11/26 and WBC trending down  - F/U pending BCXs - negative  to date  - Monitor port site  - RVP negative, covid-19 pcr negative  - continue off of antibioics    2) Positive UA  - Mild positive UA with RBCs, no nitrites  - Low suspicion, no symptoms UTI presently  - F/U pending UCX - was negative and final    3) Hemoperitoneum  - Further care per surgery teams    d/w NP    Kinga Gurrola MD  [phone] (pager)  [phone] (office)

## 2020-11-29 NOTE — PROGRESS NOTE ADULT - SUBJECTIVE AND OBJECTIVE BOX
SUBJECTIVE / OVERNIGHT EVENTS: pt denies abd pain , + flatus, denies nausea, v    MEDICATIONS  (STANDING):  amLODIPine   Tablet 5 milliGRAM(s) Oral daily  chlorhexidine 4% Liquid 1 Application(s) Topical daily  DULoxetine 60 milliGRAM(s) Oral daily  metoprolol succinate ER 25 milliGRAM(s) Oral daily  sodium chloride 0.9%. 1000 milliLiter(s) (60 mL/Hr) IV Continuous <Continuous>    MEDICATIONS  (PRN):  acetaminophen   Tablet .. 650 milliGRAM(s) Oral every 6 hours PRN Temp greater or equal to 38C (100.4F), Mild Pain (1 - 3)  morphine  - Injectable 2 milliGRAM(s) IV Push every 6 hours PRN Severe Pain (7 - 10)  oxyCODONE    IR 5 milliGRAM(s) Oral every 6 hours PRN Moderate Pain (4 - 6)    Vital Signs Last 24 Hrs  T(C): 36.6 (2020 20:27), Max: 36.8 (2020 08:35)  T(F): 97.9 (2020 20:27), Max: 98.2 (2020 08:35)  HR: 94 (2020 20:27) (87 - 94)  BP: 159/87 (2020 20:27) (147/89 - 168/77)  BP(mean): --  RR: 18 (2020 20:27) (18 - 19)  SpO2: 99% (2020 20:27) (97% - 99%)    Constitutional: No fever, fatigue  Skin: No rash.  Eyes: No recent vision problems or eye pain.  ENT: No congestion, ear pain, or sore throat.  Cardiovascular: No chest pain or palpation.  Respiratory: No cough, shortness of breath, congestion, or wheezing.  Gastrointestinal: No abdominal pain, nausea, vomiting, or diarrhea.  Genitourinary: No dysuria.  Musculoskeletal: No joint swelling.  Neurologic: No headache.    PHYSICAL EXAM:  GENERAL: NAD  EYES: EOMI, PERRLA  NECK: Supple, No JVD  CHEST/LUNG: dec breath sounds at bases   HEART:  S1 , S2 +  ABDOMEN: soft , bs+, mild distension , no tenderness, no guarding   EXTREMITIES:  no edema  NEUROLOGY:alert awake    LABS:      141  |  109<H>  |  43<H>  ----------------------------<  91  4.2   |  18<L>  |  3.78<H>    Ca    9.1      2020 06:38  Mg     2.4           Creatinine Trend: 3.78 <--, 4.02 <--, 3.36 <--, 3.74 <--, 5.00 <--, 6.36 <--, 6.55 <--, 6.75 <--                        8.9    12.37 )-----------( 111      ( 2020 21:44 )             27.5     Urine Studies:  Urinalysis Basic - ( 2020 19:38 )    Color: Light Yellow / Appearance: Slightly Turbid / S.012 / pH:   Gluc:  / Ketone: Negative  / Bili: Negative / Urobili: <2 mg/dL   Blood:  / Protein: 30 mg/dL / Nitrite: Negative   Leuk Esterase: Large / RBC: 7 /HPF / WBC 54 /HPF   Sq Epi:  / Non Sq Epi: 1 /HPF / Bacteria: Negative      Creatinine, Random Urine: 48 mg/dL ( @ 02:05)  Sodium, Random Urine: 49 mmol/L ( @ 02:05)

## 2020-11-29 NOTE — PROGRESS NOTE ADULT - PROBLEM SELECTOR PLAN 6
stage 4 colon cancer with mets to bladder - unclear if further metastatic disease present. pt awaiting medications to start oral chemo  heme onc f/u

## 2020-11-29 NOTE — CHART NOTE - NSCHARTNOTEFT_GEN_A_CORE
Patient s/p repeat ct abd/ pelvis to r/o bleed     Mesenteric hematoma, perhaps slightly decreased in size.  Right hemicolectomy with new bowel wall thickening involving the small bowel at the ileocolic anastomosis.  Distended endometrial cavity with heterogeneous intraluminal material. Further evaluation with a pelvic ultrasound is advised.  Left bladder mass, left nephroureteral stent, moderate left hydroureteronephrosis and chronic severe right hydronephrosis, unchanged.     notified , will cont to monitor

## 2020-11-30 ENCOUNTER — TRANSCRIPTION ENCOUNTER (OUTPATIENT)
Age: 70
End: 2020-11-30

## 2020-11-30 VITALS
RESPIRATION RATE: 18 BRPM | SYSTOLIC BLOOD PRESSURE: 143 MMHG | TEMPERATURE: 98 F | HEART RATE: 77 BPM | OXYGEN SATURATION: 97 % | DIASTOLIC BLOOD PRESSURE: 84 MMHG

## 2020-11-30 LAB
ANION GAP SERPL CALC-SCNC: 13 MMOL/L — SIGNIFICANT CHANGE UP (ref 5–17)
BUN SERPL-MCNC: 42 MG/DL — HIGH (ref 7–23)
CALCIUM SERPL-MCNC: 8.8 MG/DL — SIGNIFICANT CHANGE UP (ref 8.4–10.5)
CHLORIDE SERPL-SCNC: 110 MMOL/L — HIGH (ref 96–108)
CO2 SERPL-SCNC: 17 MMOL/L — LOW (ref 22–31)
CREAT SERPL-MCNC: 3.49 MG/DL — HIGH (ref 0.5–1.3)
GLUCOSE SERPL-MCNC: 86 MG/DL — SIGNIFICANT CHANGE UP (ref 70–99)
HCT VFR BLD CALC: 28.3 % — LOW (ref 34.5–45)
HGB BLD-MCNC: 9.3 G/DL — LOW (ref 11.5–15.5)
MCHC RBC-ENTMCNC: 32.5 PG — SIGNIFICANT CHANGE UP (ref 27–34)
MCHC RBC-ENTMCNC: 32.9 GM/DL — SIGNIFICANT CHANGE UP (ref 32–36)
MCV RBC AUTO: 99 FL — SIGNIFICANT CHANGE UP (ref 80–100)
NRBC # BLD: 0 /100 WBCS — SIGNIFICANT CHANGE UP (ref 0–0)
PLATELET # BLD AUTO: 116 K/UL — LOW (ref 150–400)
POTASSIUM SERPL-MCNC: 4 MMOL/L — SIGNIFICANT CHANGE UP (ref 3.5–5.3)
POTASSIUM SERPL-SCNC: 4 MMOL/L — SIGNIFICANT CHANGE UP (ref 3.5–5.3)
RBC # BLD: 2.86 M/UL — LOW (ref 3.8–5.2)
RBC # FLD: 16.9 % — HIGH (ref 10.3–14.5)
SODIUM SERPL-SCNC: 140 MMOL/L — SIGNIFICANT CHANGE UP (ref 135–145)
WBC # BLD: 12.28 K/UL — HIGH (ref 3.8–10.5)
WBC # FLD AUTO: 12.28 K/UL — HIGH (ref 3.8–10.5)

## 2020-11-30 PROCEDURE — 86803 HEPATITIS C AB TEST: CPT

## 2020-11-30 PROCEDURE — 82962 GLUCOSE BLOOD TEST: CPT

## 2020-11-30 PROCEDURE — 82435 ASSAY OF BLOOD CHLORIDE: CPT

## 2020-11-30 PROCEDURE — 85025 COMPLETE CBC W/AUTO DIFF WBC: CPT

## 2020-11-30 PROCEDURE — 84300 ASSAY OF URINE SODIUM: CPT

## 2020-11-30 PROCEDURE — 36430 TRANSFUSION BLD/BLD COMPNT: CPT

## 2020-11-30 PROCEDURE — 96361 HYDRATE IV INFUSION ADD-ON: CPT

## 2020-11-30 PROCEDURE — 84132 ASSAY OF SERUM POTASSIUM: CPT

## 2020-11-30 PROCEDURE — 82570 ASSAY OF URINE CREATININE: CPT

## 2020-11-30 PROCEDURE — 74176 CT ABD & PELVIS W/O CONTRAST: CPT

## 2020-11-30 PROCEDURE — 86901 BLOOD TYPING SEROLOGIC RH(D): CPT

## 2020-11-30 PROCEDURE — 86850 RBC ANTIBODY SCREEN: CPT

## 2020-11-30 PROCEDURE — 76770 US EXAM ABDO BACK WALL COMP: CPT

## 2020-11-30 PROCEDURE — 83690 ASSAY OF LIPASE: CPT

## 2020-11-30 PROCEDURE — 84295 ASSAY OF SERUM SODIUM: CPT

## 2020-11-30 PROCEDURE — 87040 BLOOD CULTURE FOR BACTERIA: CPT

## 2020-11-30 PROCEDURE — 93970 EXTREMITY STUDY: CPT

## 2020-11-30 PROCEDURE — 85027 COMPLETE CBC AUTOMATED: CPT

## 2020-11-30 PROCEDURE — 96375 TX/PRO/DX INJ NEW DRUG ADDON: CPT

## 2020-11-30 PROCEDURE — 0225U NFCT DS DNA&RNA 21 SARSCOV2: CPT

## 2020-11-30 PROCEDURE — 85018 HEMOGLOBIN: CPT

## 2020-11-30 PROCEDURE — 83605 ASSAY OF LACTIC ACID: CPT

## 2020-11-30 PROCEDURE — 93005 ELECTROCARDIOGRAM TRACING: CPT

## 2020-11-30 PROCEDURE — 80053 COMPREHEN METABOLIC PANEL: CPT

## 2020-11-30 PROCEDURE — U0003: CPT

## 2020-11-30 PROCEDURE — 36415 COLL VENOUS BLD VENIPUNCTURE: CPT

## 2020-11-30 PROCEDURE — 86900 BLOOD TYPING SEROLOGIC ABO: CPT

## 2020-11-30 PROCEDURE — 96374 THER/PROPH/DIAG INJ IV PUSH: CPT

## 2020-11-30 PROCEDURE — 84100 ASSAY OF PHOSPHORUS: CPT

## 2020-11-30 PROCEDURE — 85014 HEMATOCRIT: CPT

## 2020-11-30 PROCEDURE — 85610 PROTHROMBIN TIME: CPT

## 2020-11-30 PROCEDURE — 87086 URINE CULTURE/COLONY COUNT: CPT

## 2020-11-30 PROCEDURE — 99232 SBSQ HOSP IP/OBS MODERATE 35: CPT

## 2020-11-30 PROCEDURE — 99285 EMERGENCY DEPT VISIT HI MDM: CPT | Mod: 25

## 2020-11-30 PROCEDURE — 85730 THROMBOPLASTIN TIME PARTIAL: CPT

## 2020-11-30 PROCEDURE — 82330 ASSAY OF CALCIUM: CPT

## 2020-11-30 PROCEDURE — 83735 ASSAY OF MAGNESIUM: CPT

## 2020-11-30 PROCEDURE — 86769 SARS-COV-2 COVID-19 ANTIBODY: CPT

## 2020-11-30 PROCEDURE — 86923 COMPATIBILITY TEST ELECTRIC: CPT

## 2020-11-30 PROCEDURE — 71045 X-RAY EXAM CHEST 1 VIEW: CPT

## 2020-11-30 PROCEDURE — 82947 ASSAY GLUCOSE BLOOD QUANT: CPT

## 2020-11-30 PROCEDURE — 80048 BASIC METABOLIC PNL TOTAL CA: CPT

## 2020-11-30 PROCEDURE — 82803 BLOOD GASES ANY COMBINATION: CPT

## 2020-11-30 PROCEDURE — 81001 URINALYSIS AUTO W/SCOPE: CPT

## 2020-11-30 PROCEDURE — P9016: CPT

## 2020-11-30 RX ORDER — ENOXAPARIN SODIUM 100 MG/ML
0 INJECTION SUBCUTANEOUS
Qty: 0 | Refills: 0 | DISCHARGE

## 2020-11-30 RX ORDER — ACETAMINOPHEN 500 MG
2 TABLET ORAL
Qty: 0 | Refills: 0 | DISCHARGE
Start: 2020-11-30

## 2020-11-30 RX ORDER — SODIUM BICARBONATE 1 MEQ/ML
1 SYRINGE (ML) INTRAVENOUS
Qty: 60 | Refills: 0
Start: 2020-11-30 | End: 2020-12-29

## 2020-11-30 RX ORDER — SODIUM BICARBONATE 1 MEQ/ML
650 SYRINGE (ML) INTRAVENOUS
Refills: 0 | Status: DISCONTINUED | OUTPATIENT
Start: 2020-11-30 | End: 2020-11-30

## 2020-11-30 RX ADMIN — Medication 300 UNIT(S): at 11:08

## 2020-11-30 RX ADMIN — AMLODIPINE BESYLATE 5 MILLIGRAM(S): 2.5 TABLET ORAL at 05:07

## 2020-11-30 RX ADMIN — Medication 25 MILLIGRAM(S): at 05:07

## 2020-11-30 RX ADMIN — CHLORHEXIDINE GLUCONATE 1 APPLICATION(S): 213 SOLUTION TOPICAL at 11:08

## 2020-11-30 RX ADMIN — DULOXETINE HYDROCHLORIDE 60 MILLIGRAM(S): 30 CAPSULE, DELAYED RELEASE ORAL at 11:08

## 2020-11-30 NOTE — PROGRESS NOTE ADULT - ASSESSMENT
70 F with PMH HTN, neuropathic pain, stage 4 colon cancer (dx 2004 s/p hemicolectomy), with bladder recurrence c/b hydronephrosis s/p Rt ureteral stent (last exchanged 9/2020) currently on chemo [place] [facility] chest [place] (placed 2019), LE DVT on LMWH.  Leukocytosis, no fever  CT with mesenteric hematoma and hemoperitoneum  Patient generally well appearing without focal signs infection; no symptoms PNA, UTI, minimal pain  Note presence of mediport  WBC improved today, suspect leukocytosis was due to hematoma  Repeat CT shows decreasing size hematoma  Overall,  1) Leukocytosis  - Monitor closely for signs infection; presently suspect reactive picture 2/2 hematoma rather than true infection; cultures negative  - Low threshold to start broad spectrum abx if signs worsening (patient currently very well appearing at bedside)  - Monitor port site  2) Positive UA  - Mild positive UA with RBCs, no nitrites  - Low suspicion, no symptoms UTI presently  - Would not treat unless active signs UTI  3) Hemoperitoneum  - Further care per surgery teams    Holdne Valentino MD  Pager [phone]  After 5pm and on weekends call [phone]

## 2020-11-30 NOTE — PROGRESS NOTE ADULT - REASON FOR ADMISSION
abdominal pain

## 2020-11-30 NOTE — DISCHARGE NOTE NURSING/CASE MANAGEMENT/SOCIAL WORK - NSDCPNINST_GEN_ALL_CORE
CALL MD/911/GO TO ER WITH ANY CHEST PAIN/SHORTNESS OF BREATH/FEVER/BLEEDING/WEAKNESS/ANY CHANGE IN STATUS AT ALL!! BE SURE TO TAKE ALL MEDICATION AS PRESCRIBED AND FOLLOW UP WITH MD AS DIRECTED!!    **HAVE HEME/ONC MD CHECK H&H AT NEXT VISIT**

## 2020-11-30 NOTE — DISCHARGE NOTE NURSING/CASE MANAGEMENT/SOCIAL WORK - PATIENT PORTAL LINK FT
You can access the FollowMyHealth Patient Portal offered by [facility] by registering at the following website: [email or web address]. By joining [facility]’s FollowMyHealth portal, you will also be able to view your health information using other applications (apps) compatible with our system.

## 2020-11-30 NOTE — PROGRESS NOTE ADULT - SUBJECTIVE AND OBJECTIVE BOX
CC: F/U for Leukocytosis    Saw/spoke to patient. No fevers, no chills. No new complaints.    Allergies  No Known Allergies    ANTIMICROBIALS:  Off    PE:    Vital Signs Last 24 Hrs  T(C): 36.9 (30 Nov 2020 12:12), Max: 36.9 (30 Nov 2020 12:12)  T(F): 98.4 (30 Nov 2020 12:12), Max: 98.4 (30 Nov 2020 12:12)  HR: 77 (30 Nov 2020 12:12) (77 - 94)  BP: 143/84 (30 Nov 2020 12:12) (143/84 - 168/77)  RR: 18 (30 Nov 2020 12:12) (18 - 18)  SpO2: 97% (30 Nov 2020 12:12) (97% - 99%)    Gen: AOx3, NAD, non-toxic  CV: S1+S2 normal, nontachycardic  Resp: Clear bilat, no resp distress, no crackles/wheezes  Abd: Soft, nontender, +BS  Ext: No LE edema, no wounds    LABS:                        9.3    12.28 )-----------( 116      ( 30 Nov 2020 05:29 )             28.3     11-30    140  |  110<H>  |  42<H>  ----------------------------<  86  4.0   |  17<L>  |  3.49<H>    Ca    8.8      30 Nov 2020 05:29  Mg     2.4     11-29    MICROBIOLOGY:    .Urine Clean Catch (Midstream)  11-28-20   No growth    .Blood Blood-Peripheral  11-27-20   No growth to date.    .Urine Clean Catch (Midstream)  11-23-20   Normal Urogenital shae present     Rapid RVP Result: NotDetec (11-27 @ 16:02)    (otherwise reviewed)    RADIOLOGY:    11/29 CT:    IMPRESSION:  Mesenteric hematoma, perhaps slightly decreased in size.  Right hemicolectomy with new bowel wall thickening involving the small bowel at the ileocolic anastomosis.  Distended endometrial cavity with heterogeneous intraluminal material. Further evaluation with a pelvic ultrasound is advised.  Left bladder mass, left nephroureteral stent, moderate left hydroureteronephrosis and chronic severe right hydronephrosis, unchanged.

## 2020-11-30 NOTE — PROGRESS NOTE ADULT - PROVIDER SPECIALTY LIST ADULT
Attempt made to contact patient, refill given, pt needs to make an appointment for additional refills.
Gastroenterology
Heme/Onc
Infectious Disease
Infectious Disease
Internal Medicine
Needs to make follow up appt . NO more refills after this one.
Nephrology
Surgery
Urology
Urology
Internal Medicine

## 2020-11-30 NOTE — PROGRESS NOTE ADULT - ASSESSMENT
70F with PMH of HTN, neuropathic pain, stage 4 colon cancer (initially dx 2004 s/p hemicolectomy, with recurrence in past years with mets to the bladder c/b hydronephrosis s/p R ureteral stent last exchanged 9/2020) last chemo with folfox on 10/19/20  p/w abdominal pain. Patient starting having lower abdominal pain yesterday and was told to come in to the ER for eval. Ct abd shows Status post right hemicolectomy with ileocolic anastomosis. High attenuating mesenteric hematoma with hematocrit level seen within the bowel mesentery measuring 5.2 x 3.2 cm. There is associated infiltration and edema within the bowel mesentery. No bowel obstruction.    1, Abdominal pain  - pt found to have mesenteric hematoma - stable   - surgery follow up recommended observation .  - hg on presentation was 6.4 s/p PRBC on admission   - Hb today at 9.3,   - repeat CT scan shows mesenteric hematoma improving   - no abd symtpoms   - [place] [facility] [place] will repeat Cbc there   - Ct also shows Distended endometrial cavity with heterogeneous intraluminal material. will follow up outpatient with GYN     2. leukocytosis   - clinically appears reactive - sec to malignancy ?   - no s/s of infection   - trending down    - chest xray negative   - monitor temps     3. Colon cancer   - [place] [facility] [place]     4. Elevated Cr  - pt with b/l hydro with stents   - pt known abnormal cr   - Urology eval noted - no acute intervention    5. Bladder mass   - Pt had this in jan   - was biopsied and consistent with adeno     6. Catheter assoc DVT   - reported catheter associated DVT on 4/17/20  - was on lovenox 50 mg daily   - will no longer need AC   - discussed wtih [facility] hemat[place]gist     Maldonado Chacon MD  Hematology Oncology   O: [phone]  C: [phone]

## 2020-11-30 NOTE — PROGRESS NOTE ADULT - ATTENDING COMMENTS
[facility] NEPHROLOGY  Negro Salinas M.D.  Arturo Bender D.O.  Jeaneth Helms M.D.  Kinga Aranda, MSN, ANP-C    Telephone: [phone]  Facsimile: [phone]    [address] [place] [address]
[facility] NEPHROLOGY  Negro Salinas M.D.  Arturo Bender D.O.  Jeaneth Helms M.D.  Kinga Aranda, MSN, ANP-C    Telephone: [phone]  Facsimile: [phone]    [address] [place] [address]
[facility] NEPHROLOGY  Negro Salinas M.D.  Arturo Bender D.O.  Jeaneth Helms M.D.  Kinga Aranda, MSN, ANP-C    Telephone: [phone]  Facsimile: [phone]    [address] [place] [address]
[facility] NEPHROLOGY  Negro Salinas M.D.  Arturo Bender D.O.  Jeaneth Helms M.D.  Kinga Aranda, MSN, ANP-C    Telephone: [phone]  Facsimile: [phone]    [address] [place] [address]
I have seen and examined the patient.  I agree with the surgical resident's note.  patient says the abd discomfort is getting better- got a unit of PC yesterday. abd is soft and non-tender    Geogres Ch contact information [phone]
[facility] NEPHROLOGY  Negro Salinas M.D.  Arturo Bender D.O.  Jeaneth Helms M.D.  Kinga Aranda, MSN, ANP-C    Telephone: [phone]  Facsimile: [phone]    [address] [place] [address]
[facility] NEPHROLOGY  Negro Salinas M.D.  Arturo Bender D.O.  Jeaneth Helms M.D.  Kinga Aranda, MSN, ANP-C    Telephone: [phone]  Facsimile: [phone]    [address] [place] [address]
I have seen and evaluated the patient and discussed the relevant clinical findings and plan with the surgical housestaff and fellow.  Agree with the above documentation with addenda as noted.     Feels well, +flatus and BMs, no pain, mild abd discomfort/bloating.  Tolerating clears and feels hungry  VSS  Hgb stable, responded appropriately to blood transfusion    Would advance diet as tolerated   No surgical intervention necessary at this time.    Marshall Esparza MD

## 2020-11-30 NOTE — PROGRESS NOTE ADULT - ASSESSMENT
70y Female with history of stage 4 colon CA presents with abdominal pain. Nephrology consulted for elevated Scr.    1. LEONEL: Thought to be from ATN now improving with IVF. Encourage PO intake. Avoid nephrotoxins.  2. CKD-4: Baseline Scr 2.0. Patient advised to have outpatient work up. Monitor electrolytes.  3. Chronic obstruction: Bilateral hydro which appears chronic s/p left ureteral stent. As per urology.  4. HTN with CKD: BP controlled. Continue with current medications but change diet to low sodium. Monitor BP. 70y Female with history of stage 4 colon CA presents with abdominal pain. Nephrology consulted for elevated Scr.    1. LEONEL: Thought to be from ATN now improving with IVF. Encourage PO intake. Avoid nephrotoxins.  2. CKD-4: Baseline Scr 2.0. Patient advised to have outpatient work up. Monitor electrolytes.  3. Chronic obstruction: Bilateral hydro which appears chronic s/p left ureteral stent. As per urology.  4. HTN with CKD: BP controlled. Continue with current medications but change diet to low sodium. Monitor BP.  5. Metabolic acidosis: In setting of renal insufficiency. Start sodium bicarbonate 650 mg twice daily. Monitor CO2.

## 2020-11-30 NOTE — PROGRESS NOTE ADULT - SUBJECTIVE AND OBJECTIVE BOX
[facility] NEPHROLOGY- PROGRESS NOTE    70y Female with history of stage 4 colon CA presents with abdominal pain. Nephrology consulted for elevated Scr.    REVIEW OF SYSTEMS:  Gen: no changes in weight  Cards: no chest pain  Resp: no dyspnea  GI: no nausea or vomiting or diarrhea, + ab pain improving  Vascular: no LE edema    No Known Allergies      Hospital Medications: Medications reviewed    VITALS:  T(F): 98.3 (20 @ 04:37), Max: 98.3 (20 04:37)  HR: 86 (20:37)  BP: 147/84 (20 04:37)  RR: 18 (20:37)  SpO2: 98% (20:37)  Wt(kg): --  Height (cm): 160 ( 22:12), 157.5 (:30)  Weight (kg): 54.3 ( 22:12)  BMI (kg/m2): 21.2 ( 22:12)  BSA (m2): 1.55 ( 22:12)     @ 07:01  -   @ 07:00  --------------------------------------------------------  IN: 360 mL / OUT: 0 mL / NET: 360 mL     @ 07:01  -   @ 12:10  --------------------------------------------------------  IN: 240 mL / OUT: 0 mL / NET: 240 mL        PHYSICAL EXAM:    Gen: NAD, calm  Cards: RRR, +S1/S2, no M/G/R  Resp: CTA B/L  GI: soft, NT/ND, NABS  Vascular: no LE edema B/L    LABS:      140  |  110<H>  |  42<H>  ----------------------------<  86  4.0   |  17<L>  |  3.49<H>    Ca    8.8      2020 05:29  Mg     2.4           Creatinine Trend: 3.49 <--, 3.78 <--, 4.02 <--, 3.36 <--, 3.74 <--, 5.00 <--, 6.36 <--, 6.55 <--, 6.75 <--                        9.3    12.28 )-----------( 116      ( 2020 05:29 )             28.3     Urine Studies:  Urinalysis Basic - ( 2020 19:38 )    Color: Light Yellow / Appearance: Slightly Turbid / S.012 / pH:   Gluc:  / Ketone: Negative  / Bili: Negative / Urobili: <2 mg/dL   Blood:  / Protein: 30 mg/dL / Nitrite: Negative   Leuk Esterase: Large / RBC: 7 /HPF / WBC 54 /HPF   Sq Epi:  / Non Sq Epi: 1 /HPF / Bacteria: Negative      Creatinine, Random Urine: 48 mg/dL ( @ 02:05)  Sodium, Random Urine: 49 mmol/L ( @ 02:05)      RADIOLOGY & ADDITIONAL STUDIES:    < from: CT Abdomen and Pelvis No Cont (20 @ 13:13) >  IMPRESSION:  Mesenteric hematoma, perhaps slightly decreased in size.  Right hemicolectomy with new bowel wall thickening involving the small bowel at the ileocolic anastomosis.  Distended endometrial cavity with heterogeneous intraluminal material. Further evaluation with a pelvic ultrasound is advised.  Left bladder mass, left nephroureteral stent, moderate left hydroureteronephrosis and chronic severe right hydronephrosis, unchanged.    < end of copied text >

## 2020-11-30 NOTE — PROGRESS NOTE ADULT - SUBJECTIVE AND OBJECTIVE BOX
MARISOL [id number]  70yFemale  T(C): 36.8 (11-30-20 @ 04:37), Max: 36.8 (11-29-20 @ 08:35)  HR: 86 (11-30-20 @ 04:37) (86 - 94)  BP: 147/84 (11-30-20 @ 04:37) (147/84 - 168/77)  RR: 18 (11-30-20 @ 04:37) (18 - 19)  SpO2: 98% (11-30-20 @ 04:37) (97% - 99%)  Wt(kg): --  11-29 @ 07:01  -  11-30 @ 07:00  --------------------------------------------------------  IN: 360 mL / OUT: 0 mL / NET: 360 mL      normal cephalic atraumatic  s1s2   clear to ascultation bilaterally  soft, non tender, non distended no guarding or rebound  no clubbing cyanosis or edema

## 2020-11-30 NOTE — PROGRESS NOTE ADULT - SUBJECTIVE AND OBJECTIVE BOX
Patient seen and examined at bedside. pt feels well. sitting in chair. Denies any bleeding from rectum or vagina. would like to go home     MEDICATIONS  (STANDING):  amLODIPine   Tablet 5 milliGRAM(s) Oral daily  chlorhexidine 4% Liquid 1 Application(s) Topical daily  DULoxetine 60 milliGRAM(s) Oral daily  heparin  Lock Flush 100 Units/mL Injectable 300 Unit(s) IV Push once  metoprolol succinate ER 25 milliGRAM(s) Oral daily  sodium chloride 0.9%. 1000 milliLiter(s) (60 mL/Hr) IV Continuous <Continuous>    MEDICATIONS  (PRN):  acetaminophen   Tablet .. 650 milliGRAM(s) Oral every 6 hours PRN Temp greater or equal to 38C (100.4F), Mild Pain (1 - 3)  morphine  - Injectable 2 milliGRAM(s) IV Push every 6 hours PRN Severe Pain (7 - 10)  oxyCODONE    IR 5 milliGRAM(s) Oral every 6 hours PRN Moderate Pain (4 - 6)        Vital Signs Last 24 Hrs  T(C): 36.8 (30 Nov 2020 04:37), Max: 36.8 (30 Nov 2020 00:27)  T(F): 98.3 (30 Nov 2020 04:37), Max: 98.3 (30 Nov 2020 04:37)  HR: 86 (30 Nov 2020 04:37) (86 - 94)  BP: 147/84 (30 Nov 2020 04:37) (147/84 - 168/77)  BP(mean): --  RR: 18 (30 Nov 2020 04:37) (18 - 18)  SpO2: 98% (30 Nov 2020 04:37) (97% - 99%)      PHYSICAL EXAM:     GENERAL:  Appears stated age, well-groomed  HEENT:  NC/AT,    CHEST:  CTA b/l  HEART:  S1 s2+   ABDOMEN:  Soft, non-tender, non-distended  EXTEREMITIES:  no cyanosis,clubbing or edema  SKIN:  No rash/erythema/ecchymoses  NEURO:  Alert, oriented, no asterixis                            9.3    12.28 )-----------( 116      ( 30 Nov 2020 05:29 )             28.3       11-30    140  |  110<H>  |  42<H>  ----------------------------<  86  4.0   |  17<L>  |  3.49<H>    Ca    8.8      30 Nov 2020 05:29  Mg     2.4     11-29

## 2020-12-02 LAB
CULTURE RESULTS: SIGNIFICANT CHANGE UP
CULTURE RESULTS: SIGNIFICANT CHANGE UP
SPECIMEN SOURCE: SIGNIFICANT CHANGE UP
SPECIMEN SOURCE: SIGNIFICANT CHANGE UP

## 2021-10-19 NOTE — CONSULT NOTE ADULT - CONSULT REQUESTED DATE/TIME
[Dear  ___] : Dear  [unfilled], 23-Nov-2020 [Courtesy Letter:] : I had the pleasure of seeing your patient, [unfilled], in my office today. [Please see my note below.] : Please see my note below. [Consult Closing:] : Thank you very much for allowing me to participate in the care of this patient.  If you have any questions, please do not hesitate to contact me. [Sincerely,] : Sincerely, [FreeTextEntry3] : HARITHA Madden\par Pediatric Nurse Practitioner\par [facility] Division of Pediatric Endocrinology\par \par

## 2023-02-24 NOTE — PROGRESS NOTE ADULT - ASSESSMENT
Approved  pa Ajovy 225mg/1.5ml   Qty 1/30   Dates:2/22/23 to 2/24/23   Case # [id number]  Renewal    70 y old female with colon cancer now invading into the bladder with chronic left renal obstruction and stent in place;;  Her creatinine is trending down without any urological intervention.  Makes urine     renal insufficiency acute on chronic - - nor urological intervention needed at this point.   She can follow with her primary urologist for chronic  stent management.       I have reviewed notes and images and labs   Total time coordinating care 35 min   Discussed with urology team.

## 2024-07-01 NOTE — ED ADULT NURSE NOTE - CAS DISCH BELONGINGS RETURNED
Show Spray Paint Technique Variable?: Yes Add 52 Modifier (Optional): no Post-Care Instructions: I reviewed with the patient in detail post-care instructions. Patient is to wear sunprotection, and avoid picking at any of the treated lesions. Pt may apply Vaseline to crusted or scabbing areas. Medical Necessity Information: It is in your best interest to select a reason for this procedure from the list below. All of these items fulfill various CMS LCD requirements except the new and changing color options. Detail Level: Zone Consent: The patient's consent was obtained including but not limited to risks of crusting, scabbing, blistering, scarring, darker or lighter pigmentary change, recurrence, incomplete removal and infection. Medical Necessity Clause: This procedure was medically necessary because the lesions that were treated were: Duration Of Freeze Thaw-Cycle (Seconds): 0 Spray Paint Text: The liquid nitrogen was applied to the skin utilizing a spray paint frosting technique. Total Number Of Lesions Treated: 5 Yes
